# Patient Record
Sex: FEMALE | Race: WHITE | NOT HISPANIC OR LATINO
[De-identification: names, ages, dates, MRNs, and addresses within clinical notes are randomized per-mention and may not be internally consistent; named-entity substitution may affect disease eponyms.]

---

## 2017-01-23 ENCOUNTER — RECORDS - HEALTHEAST (OUTPATIENT)
Dept: ADMINISTRATIVE | Facility: OTHER | Age: 82
End: 2017-01-23

## 2017-08-30 ENCOUNTER — COMMUNICATION - HEALTHEAST (OUTPATIENT)
Dept: INTERNAL MEDICINE | Facility: CLINIC | Age: 82
End: 2017-08-30

## 2017-09-08 ENCOUNTER — RECORDS - HEALTHEAST (OUTPATIENT)
Dept: ADMINISTRATIVE | Facility: OTHER | Age: 82
End: 2017-09-08

## 2017-09-14 ENCOUNTER — COMMUNICATION - HEALTHEAST (OUTPATIENT)
Dept: INTERNAL MEDICINE | Facility: CLINIC | Age: 82
End: 2017-09-14

## 2017-11-08 ENCOUNTER — OFFICE VISIT - HEALTHEAST (OUTPATIENT)
Dept: INTERNAL MEDICINE | Facility: CLINIC | Age: 82
End: 2017-11-08

## 2017-11-08 ENCOUNTER — COMMUNICATION - HEALTHEAST (OUTPATIENT)
Dept: INTERNAL MEDICINE | Facility: CLINIC | Age: 82
End: 2017-11-08

## 2017-11-08 DIAGNOSIS — G57.11 MERALGIA PARESTHETICA OF RIGHT SIDE: ICD-10-CM

## 2017-11-08 ASSESSMENT — MIFFLIN-ST. JEOR: SCORE: 1061.27

## 2017-11-28 ENCOUNTER — RECORDS - HEALTHEAST (OUTPATIENT)
Dept: ADMINISTRATIVE | Facility: OTHER | Age: 82
End: 2017-11-28

## 2018-02-06 ENCOUNTER — RECORDS - HEALTHEAST (OUTPATIENT)
Dept: ADMINISTRATIVE | Facility: OTHER | Age: 83
End: 2018-02-06

## 2018-04-10 ENCOUNTER — RECORDS - HEALTHEAST (OUTPATIENT)
Dept: ADMINISTRATIVE | Facility: OTHER | Age: 83
End: 2018-04-10

## 2018-06-21 ENCOUNTER — OFFICE VISIT - HEALTHEAST (OUTPATIENT)
Dept: INTERNAL MEDICINE | Facility: CLINIC | Age: 83
End: 2018-06-21

## 2018-06-21 ENCOUNTER — COMMUNICATION - HEALTHEAST (OUTPATIENT)
Dept: SCHEDULING | Facility: CLINIC | Age: 83
End: 2018-06-21

## 2018-06-21 DIAGNOSIS — M54.2 CERVICALGIA: ICD-10-CM

## 2018-07-18 ENCOUNTER — OFFICE VISIT - HEALTHEAST (OUTPATIENT)
Dept: INTERNAL MEDICINE | Facility: CLINIC | Age: 83
End: 2018-07-18

## 2018-07-18 ENCOUNTER — RECORDS - HEALTHEAST (OUTPATIENT)
Dept: GENERAL RADIOLOGY | Facility: CLINIC | Age: 83
End: 2018-07-18

## 2018-07-18 DIAGNOSIS — S69.92XA UNSPECIFIED INJURY OF LEFT WRIST, HAND AND FINGER(S), INITIAL ENCOUNTER: ICD-10-CM

## 2018-07-18 DIAGNOSIS — S69.92XA WRIST INJURY, LEFT, INITIAL ENCOUNTER: ICD-10-CM

## 2018-07-18 ASSESSMENT — MIFFLIN-ST. JEOR: SCORE: 1052.2

## 2018-07-19 ENCOUNTER — COMMUNICATION - HEALTHEAST (OUTPATIENT)
Dept: INTERNAL MEDICINE | Facility: CLINIC | Age: 83
End: 2018-07-19

## 2019-01-02 ENCOUNTER — OFFICE VISIT - HEALTHEAST (OUTPATIENT)
Dept: INTERNAL MEDICINE | Facility: CLINIC | Age: 84
End: 2019-01-02

## 2019-01-02 DIAGNOSIS — J40 TRACHEOBRONCHITIS: ICD-10-CM

## 2019-01-02 ASSESSMENT — MIFFLIN-ST. JEOR: SCORE: 1065.81

## 2019-01-18 ENCOUNTER — OFFICE VISIT - HEALTHEAST (OUTPATIENT)
Dept: INTERNAL MEDICINE | Facility: CLINIC | Age: 84
End: 2019-01-18

## 2019-01-18 DIAGNOSIS — K62.5 RECTAL BLEEDING: ICD-10-CM

## 2019-01-18 ASSESSMENT — MIFFLIN-ST. JEOR: SCORE: 1052.2

## 2019-01-21 ENCOUNTER — RECORDS - HEALTHEAST (OUTPATIENT)
Dept: ADMINISTRATIVE | Facility: OTHER | Age: 84
End: 2019-01-21

## 2019-01-22 ENCOUNTER — RECORDS - HEALTHEAST (OUTPATIENT)
Dept: GENERAL RADIOLOGY | Facility: CLINIC | Age: 84
End: 2019-01-22

## 2019-01-22 ENCOUNTER — OFFICE VISIT - HEALTHEAST (OUTPATIENT)
Dept: INTERNAL MEDICINE | Facility: CLINIC | Age: 84
End: 2019-01-22

## 2019-01-22 DIAGNOSIS — C50.311 MALIGNANT NEOPLASM OF LOWER-INNER QUADRANT OF RIGHT FEMALE BREAST, UNSPECIFIED ESTROGEN RECEPTOR STATUS (H): ICD-10-CM

## 2019-01-22 DIAGNOSIS — D49.3 BREAST TUMOR: ICD-10-CM

## 2019-01-22 DIAGNOSIS — C50.311 MALIGNANT NEOPLASM OF LOWER-INNER QUADRANT OF RIGHT FEMALE BREAST (H): ICD-10-CM

## 2019-01-22 LAB
ALBUMIN SERPL-MCNC: 3.3 G/DL (ref 3.5–5)
ALP SERPL-CCNC: 79 U/L (ref 45–120)
ALT SERPL W P-5'-P-CCNC: 17 U/L (ref 0–45)
ANION GAP SERPL CALCULATED.3IONS-SCNC: 15 MMOL/L (ref 5–18)
AST SERPL W P-5'-P-CCNC: 17 U/L (ref 0–40)
BILIRUB SERPL-MCNC: 0.4 MG/DL (ref 0–1)
BUN SERPL-MCNC: 28 MG/DL (ref 8–28)
CALCIUM SERPL-MCNC: 9.7 MG/DL (ref 8.5–10.5)
CEA SERPL-MCNC: 2.7 NG/ML (ref 0–3)
CHLORIDE BLD-SCNC: 101 MMOL/L (ref 98–107)
CO2 SERPL-SCNC: 23 MMOL/L (ref 22–31)
CREAT SERPL-MCNC: 1.01 MG/DL (ref 0.6–1.1)
ERYTHROCYTE [DISTWIDTH] IN BLOOD BY AUTOMATED COUNT: 10.9 % (ref 11–14.5)
GFR SERPL CREATININE-BSD FRML MDRD: 52 ML/MIN/1.73M2
GLUCOSE BLD-MCNC: 71 MG/DL (ref 70–125)
HCT VFR BLD AUTO: 42.5 % (ref 35–47)
HGB BLD-MCNC: 14 G/DL (ref 12–16)
MCH RBC QN AUTO: 30.7 PG (ref 27–34)
MCHC RBC AUTO-ENTMCNC: 33 G/DL (ref 32–36)
MCV RBC AUTO: 93 FL (ref 80–100)
PLATELET # BLD AUTO: 315 THOU/UL (ref 140–440)
PMV BLD AUTO: 8.2 FL (ref 7–10)
POTASSIUM BLD-SCNC: 4.4 MMOL/L (ref 3.5–5)
PROT SERPL-MCNC: 7 G/DL (ref 6–8)
RBC # BLD AUTO: 4.56 MILL/UL (ref 3.8–5.4)
SODIUM SERPL-SCNC: 139 MMOL/L (ref 136–145)
WBC: 9.3 THOU/UL (ref 4–11)

## 2019-01-22 ASSESSMENT — MIFFLIN-ST. JEOR: SCORE: 1052.2

## 2019-01-23 ENCOUNTER — COMMUNICATION - HEALTHEAST (OUTPATIENT)
Dept: INTERNAL MEDICINE | Facility: CLINIC | Age: 84
End: 2019-01-23

## 2019-01-25 ENCOUNTER — HOSPITAL ENCOUNTER (OUTPATIENT)
Dept: ULTRASOUND IMAGING | Facility: CLINIC | Age: 84
Discharge: HOME | End: 2019-01-25
Attending: INTERNAL MEDICINE

## 2019-01-25 ENCOUNTER — HOSPITAL ENCOUNTER (OUTPATIENT)
Dept: MAMMOGRAPHY | Facility: CLINIC | Age: 84
Discharge: HOME | End: 2019-01-25
Attending: INTERNAL MEDICINE

## 2019-01-25 DIAGNOSIS — D49.3 BREAST TUMOR: ICD-10-CM

## 2019-01-25 DIAGNOSIS — C50.311 MALIGNANT NEOPLASM OF LOWER-INNER QUADRANT OF RIGHT FEMALE BREAST, UNSPECIFIED ESTROGEN RECEPTOR STATUS (H): ICD-10-CM

## 2019-01-28 ENCOUNTER — COMMUNICATION - HEALTHEAST (OUTPATIENT)
Dept: INTERNAL MEDICINE | Facility: CLINIC | Age: 84
End: 2019-01-28

## 2019-01-29 ENCOUNTER — OFFICE VISIT - HEALTHEAST (OUTPATIENT)
Dept: INTERNAL MEDICINE | Facility: CLINIC | Age: 84
End: 2019-01-29

## 2019-01-29 ENCOUNTER — RECORDS - HEALTHEAST (OUTPATIENT)
Dept: GENERAL RADIOLOGY | Facility: CLINIC | Age: 84
End: 2019-01-29

## 2019-01-29 DIAGNOSIS — C50.311 MALIGNANT NEOPLASM OF LOWER-INNER QUADRANT OF RIGHT FEMALE BREAST, UNSPECIFIED ESTROGEN RECEPTOR STATUS (H): ICD-10-CM

## 2019-01-29 DIAGNOSIS — I50.31 ACUTE DIASTOLIC CONGESTIVE HEART FAILURE (H): ICD-10-CM

## 2019-01-29 DIAGNOSIS — J90 PLEURAL EFFUSION, NOT ELSEWHERE CLASSIFIED: ICD-10-CM

## 2019-01-29 DIAGNOSIS — J90 PLEURAL EFFUSION: ICD-10-CM

## 2019-01-29 LAB
BNP SERPL-MCNC: 156 PG/ML (ref 0–167)
INR PPP: 0.99 (ref 0.9–1.1)

## 2019-01-29 ASSESSMENT — MIFFLIN-ST. JEOR: SCORE: 1052.2

## 2019-01-31 ENCOUNTER — COMMUNICATION - HEALTHEAST (OUTPATIENT)
Dept: INTERNAL MEDICINE | Facility: CLINIC | Age: 84
End: 2019-01-31

## 2019-01-31 ENCOUNTER — HOSPITAL ENCOUNTER (OUTPATIENT)
Dept: CT IMAGING | Facility: CLINIC | Age: 84
Discharge: HOME | End: 2019-01-31
Attending: INTERNAL MEDICINE | Admitting: RADIOLOGY

## 2019-01-31 ENCOUNTER — HOSPITAL ENCOUNTER (OUTPATIENT)
Dept: ULTRASOUND IMAGING | Facility: CLINIC | Age: 84
Discharge: HOME | End: 2019-01-31
Attending: INTERNAL MEDICINE

## 2019-01-31 DIAGNOSIS — J90 PLEURAL EFFUSION: ICD-10-CM

## 2019-01-31 DIAGNOSIS — C48.0 MALIGNANT NEOPLASM OF RETROPERITONEUM (H): ICD-10-CM

## 2019-01-31 DIAGNOSIS — C50.311 MALIGNANT NEOPLASM OF LOWER-INNER QUADRANT OF RIGHT FEMALE BREAST, UNSPECIFIED ESTROGEN RECEPTOR STATUS (H): ICD-10-CM

## 2019-01-31 LAB
APPEARANCE FLD: ABNORMAL
COLOR FLD: YELLOW
CREAT BLD-MCNC: 1 MG/DL
EOSINOPHIL NFR FLD MANUAL: ABNORMAL %
GLUCOSE FLD-MCNC: 42 MG/DL
LDH FLD L TO P-CCNC: 523 U/L
LYMPHOCYTES NFR FLD MANUAL: 11 %
MACROPHAGE % - HISTORICAL: 61 %
MESOTHELIALS, FLUID: 4 %
MONOCYTE % - HISTORICAL: 12 %
NEUTS BAND NFR FLD MANUAL: 7 %
OTHER CELLS FLD MANUAL: 6 %
POC GFR AMER AF HE - HISTORICAL: >60 ML/MIN/1.73M2
POC GFR NON AMER AF HE - HISTORICAL: 54 ML/MIN/1.73M2
PROT FLD-MCNC: 4 G/DL
RBC FLUID - HISTORICAL: ABNORMAL /UL
WBC # FLD AUTO: 3905 /UL (ref 0–99)

## 2019-02-01 ENCOUNTER — COMMUNICATION - HEALTHEAST (OUTPATIENT)
Dept: INTERNAL MEDICINE | Facility: CLINIC | Age: 84
End: 2019-02-01

## 2019-02-03 LAB
BACTERIA SPEC CULT: NO GROWTH
GRAM STAIN RESULT: NORMAL
GRAM STAIN RESULT: NORMAL

## 2019-02-04 ENCOUNTER — RECORDS - HEALTHEAST (OUTPATIENT)
Dept: ADMINISTRATIVE | Facility: OTHER | Age: 84
End: 2019-02-04

## 2019-02-05 LAB
CAP COMMENT: ABNORMAL
LAB AP CHARGES (HE HISTORICAL CONVERSION): ABNORMAL
LAB MED GENERAL PATH INTERP (HE HISTORICAL CONVERSION): ABNORMAL
PATH REPORT.COMMENTS IMP SPEC: ABNORMAL
PATH REPORT.FINAL DX SPEC: ABNORMAL
PATH REPORT.MICROSCOPIC SPEC OTHER STN: ABNORMAL
PATH REPORT.MICROSCOPIC SPEC OTHER STN: ABNORMAL
PATH REPORT.RELEVANT HX SPEC: ABNORMAL
SPECIMEN DESCRIPTION: ABNORMAL

## 2019-02-07 ENCOUNTER — AMBULATORY - HEALTHEAST (OUTPATIENT)
Dept: ONCOLOGY | Facility: CLINIC | Age: 84
End: 2019-02-07

## 2019-02-07 ENCOUNTER — OFFICE VISIT - HEALTHEAST (OUTPATIENT)
Dept: ONCOLOGY | Facility: CLINIC | Age: 84
End: 2019-02-07

## 2019-02-07 ENCOUNTER — COMMUNICATION - HEALTHEAST (OUTPATIENT)
Dept: ONCOLOGY | Facility: CLINIC | Age: 84
End: 2019-02-07

## 2019-02-07 DIAGNOSIS — Z79.899 ENCOUNTER FOR MEDICATION MANAGEMENT: ICD-10-CM

## 2019-02-07 DIAGNOSIS — J90 PLEURAL EFFUSION: ICD-10-CM

## 2019-02-07 DIAGNOSIS — C50.311 MALIGNANT NEOPLASM OF LOWER-INNER QUADRANT OF RIGHT FEMALE BREAST, UNSPECIFIED ESTROGEN RECEPTOR STATUS (H): ICD-10-CM

## 2019-02-07 ASSESSMENT — MIFFLIN-ST. JEOR: SCORE: 1055.04

## 2019-02-08 ENCOUNTER — COMMUNICATION - HEALTHEAST (OUTPATIENT)
Dept: ONCOLOGY | Facility: CLINIC | Age: 84
End: 2019-02-08

## 2019-02-08 LAB — CANCER AG27-29 SERPL-ACNC: 62 U/ML (ref 0–39)

## 2019-02-13 ENCOUNTER — COMMUNICATION - HEALTHEAST (OUTPATIENT)
Dept: INTERNAL MEDICINE | Facility: CLINIC | Age: 84
End: 2019-02-13

## 2019-02-14 ENCOUNTER — AMBULATORY - HEALTHEAST (OUTPATIENT)
Dept: INFUSION THERAPY | Facility: CLINIC | Age: 84
End: 2019-02-14

## 2019-02-14 ENCOUNTER — HOSPITAL ENCOUNTER (OUTPATIENT)
Dept: NUCLEAR MEDICINE | Facility: CLINIC | Age: 84
Setting detail: RADIATION/ONCOLOGY SERIES
Discharge: STILL ADMITTED OR ADMINISTRATIVE | End: 2019-02-14
Attending: INTERNAL MEDICINE

## 2019-02-14 ENCOUNTER — HOSPITAL ENCOUNTER (OUTPATIENT)
Dept: ULTRASOUND IMAGING | Facility: CLINIC | Age: 84
Setting detail: RADIATION/ONCOLOGY SERIES
Discharge: STILL ADMITTED OR ADMINISTRATIVE | End: 2019-02-14
Attending: INTERNAL MEDICINE

## 2019-02-14 DIAGNOSIS — C50.311 MALIGNANT NEOPLASM OF LOWER-INNER QUADRANT OF RIGHT FEMALE BREAST, UNSPECIFIED ESTROGEN RECEPTOR STATUS (H): ICD-10-CM

## 2019-02-14 DIAGNOSIS — J90 PLEURAL EFFUSION: ICD-10-CM

## 2019-02-14 DIAGNOSIS — Z79.899 ENCOUNTER FOR MEDICATION MANAGEMENT: ICD-10-CM

## 2019-02-14 LAB
BASOPHILS # BLD AUTO: 0.1 THOU/UL (ref 0–0.2)
BASOPHILS NFR BLD AUTO: 1 % (ref 0–2)
EOSINOPHIL # BLD AUTO: 0.1 THOU/UL (ref 0–0.4)
EOSINOPHIL NFR BLD AUTO: 1 % (ref 0–6)
ERYTHROCYTE [DISTWIDTH] IN BLOOD BY AUTOMATED COUNT: 12.8 % (ref 11–14.5)
HCT VFR BLD AUTO: 41.2 % (ref 35–47)
HGB BLD-MCNC: 13.6 G/DL (ref 12–16)
LYMPHOCYTES # BLD AUTO: 1.5 THOU/UL (ref 0.8–4.4)
LYMPHOCYTES NFR BLD AUTO: 18 % (ref 20–40)
MCH RBC QN AUTO: 30.8 PG (ref 27–34)
MCHC RBC AUTO-ENTMCNC: 33 G/DL (ref 32–36)
MCV RBC AUTO: 93 FL (ref 80–100)
MONOCYTES # BLD AUTO: 0.7 THOU/UL (ref 0–0.9)
MONOCYTES NFR BLD AUTO: 9 % (ref 2–10)
NEUTROPHILS # BLD AUTO: 6 THOU/UL (ref 2–7.7)
NEUTROPHILS NFR BLD AUTO: 71 % (ref 50–70)
PLATELET # BLD AUTO: 298 THOU/UL (ref 140–440)
PMV BLD AUTO: 9.6 FL (ref 8.5–12.5)
RBC # BLD AUTO: 4.42 MILL/UL (ref 3.8–5.4)
WBC: 8.4 THOU/UL (ref 4–11)

## 2019-02-22 ENCOUNTER — AMBULATORY - HEALTHEAST (OUTPATIENT)
Dept: INFUSION THERAPY | Facility: CLINIC | Age: 84
End: 2019-02-22

## 2019-02-22 DIAGNOSIS — C50.311 MALIGNANT NEOPLASM OF LOWER-INNER QUADRANT OF RIGHT FEMALE BREAST, UNSPECIFIED ESTROGEN RECEPTOR STATUS (H): ICD-10-CM

## 2019-02-22 DIAGNOSIS — Z79.899 ENCOUNTER FOR MEDICATION MANAGEMENT: ICD-10-CM

## 2019-02-22 LAB
BASOPHILS # BLD AUTO: 0.1 THOU/UL (ref 0–0.2)
BASOPHILS NFR BLD AUTO: 1 % (ref 0–2)
EOSINOPHIL # BLD AUTO: 0.1 THOU/UL (ref 0–0.4)
EOSINOPHIL NFR BLD AUTO: 2 % (ref 0–6)
ERYTHROCYTE [DISTWIDTH] IN BLOOD BY AUTOMATED COUNT: 12.9 % (ref 11–14.5)
HCT VFR BLD AUTO: 42.3 % (ref 35–47)
HGB BLD-MCNC: 13.7 G/DL (ref 12–16)
LYMPHOCYTES # BLD AUTO: 2.1 THOU/UL (ref 0.8–4.4)
LYMPHOCYTES NFR BLD AUTO: 25 % (ref 20–40)
MCH RBC QN AUTO: 30.6 PG (ref 27–34)
MCHC RBC AUTO-ENTMCNC: 32.4 G/DL (ref 32–36)
MCV RBC AUTO: 94 FL (ref 80–100)
MONOCYTES # BLD AUTO: 0.5 THOU/UL (ref 0–0.9)
MONOCYTES NFR BLD AUTO: 6 % (ref 2–10)
NEUTROPHILS # BLD AUTO: 5.7 THOU/UL (ref 2–7.7)
NEUTROPHILS NFR BLD AUTO: 67 % (ref 50–70)
PLATELET # BLD AUTO: 302 THOU/UL (ref 140–440)
PMV BLD AUTO: 10.1 FL (ref 8.5–12.5)
RBC # BLD AUTO: 4.48 MILL/UL (ref 3.8–5.4)
WBC: 8.6 THOU/UL (ref 4–11)

## 2019-03-07 ENCOUNTER — AMBULATORY - HEALTHEAST (OUTPATIENT)
Dept: INFUSION THERAPY | Facility: CLINIC | Age: 84
End: 2019-03-07

## 2019-03-07 ENCOUNTER — RECORDS - HEALTHEAST (OUTPATIENT)
Dept: ADMINISTRATIVE | Facility: OTHER | Age: 84
End: 2019-03-07

## 2019-03-07 ENCOUNTER — OFFICE VISIT - HEALTHEAST (OUTPATIENT)
Dept: ONCOLOGY | Facility: CLINIC | Age: 84
End: 2019-03-07

## 2019-03-07 DIAGNOSIS — C50.311 MALIGNANT NEOPLASM OF LOWER-INNER QUADRANT OF RIGHT FEMALE BREAST, UNSPECIFIED ESTROGEN RECEPTOR STATUS (H): ICD-10-CM

## 2019-03-07 DIAGNOSIS — Z79.899 ENCOUNTER FOR MEDICATION MANAGEMENT: ICD-10-CM

## 2019-03-07 LAB
ALBUMIN SERPL-MCNC: 3.1 G/DL (ref 3.5–5)
ALP SERPL-CCNC: 77 U/L (ref 45–120)
ALT SERPL W P-5'-P-CCNC: 16 U/L (ref 0–45)
ANION GAP SERPL CALCULATED.3IONS-SCNC: 7 MMOL/L (ref 5–18)
AST SERPL W P-5'-P-CCNC: 17 U/L (ref 0–40)
BASOPHILS # BLD AUTO: 0.1 THOU/UL (ref 0–0.2)
BASOPHILS NFR BLD AUTO: 1 % (ref 0–2)
BILIRUB SERPL-MCNC: 0.4 MG/DL (ref 0–1)
BUN SERPL-MCNC: 27 MG/DL (ref 8–28)
CALCIUM SERPL-MCNC: 9.7 MG/DL (ref 8.5–10.5)
CHLORIDE BLD-SCNC: 100 MMOL/L (ref 98–107)
CO2 SERPL-SCNC: 31 MMOL/L (ref 22–31)
CREAT SERPL-MCNC: 1.07 MG/DL (ref 0.6–1.1)
EOSINOPHIL # BLD AUTO: 0.2 THOU/UL (ref 0–0.4)
EOSINOPHIL NFR BLD AUTO: 3 % (ref 0–6)
ERYTHROCYTE [DISTWIDTH] IN BLOOD BY AUTOMATED COUNT: 13 % (ref 11–14.5)
GFR SERPL CREATININE-BSD FRML MDRD: 49 ML/MIN/1.73M2
GLUCOSE BLD-MCNC: 119 MG/DL (ref 70–125)
HCT VFR BLD AUTO: 43.5 % (ref 35–47)
HGB BLD-MCNC: 14.1 G/DL (ref 12–16)
LYMPHOCYTES # BLD AUTO: 1.3 THOU/UL (ref 0.8–4.4)
LYMPHOCYTES NFR BLD AUTO: 15 % (ref 20–40)
MCH RBC QN AUTO: 30.5 PG (ref 27–34)
MCHC RBC AUTO-ENTMCNC: 32.4 G/DL (ref 32–36)
MCV RBC AUTO: 94 FL (ref 80–100)
MONOCYTES # BLD AUTO: 0.5 THOU/UL (ref 0–0.9)
MONOCYTES NFR BLD AUTO: 5 % (ref 2–10)
NEUTROPHILS # BLD AUTO: 6.6 THOU/UL (ref 2–7.7)
NEUTROPHILS NFR BLD AUTO: 77 % (ref 50–70)
PLATELET # BLD AUTO: 275 THOU/UL (ref 140–440)
PMV BLD AUTO: 9.6 FL (ref 8.5–12.5)
POTASSIUM BLD-SCNC: 4.4 MMOL/L (ref 3.5–5)
PROT SERPL-MCNC: 6.9 G/DL (ref 6–8)
RBC # BLD AUTO: 4.63 MILL/UL (ref 3.8–5.4)
SODIUM SERPL-SCNC: 138 MMOL/L (ref 136–145)
WBC: 8.6 THOU/UL (ref 4–11)

## 2019-03-12 ENCOUNTER — AMBULATORY - HEALTHEAST (OUTPATIENT)
Dept: ONCOLOGY | Facility: CLINIC | Age: 84
End: 2019-03-12

## 2019-03-12 ENCOUNTER — COMMUNICATION - HEALTHEAST (OUTPATIENT)
Dept: ONCOLOGY | Facility: CLINIC | Age: 84
End: 2019-03-12

## 2019-03-12 DIAGNOSIS — C50.311 MALIGNANT NEOPLASM OF LOWER-INNER QUADRANT OF RIGHT BREAST OF FEMALE, ESTROGEN RECEPTOR POSITIVE (H): ICD-10-CM

## 2019-03-12 DIAGNOSIS — J90 PLEURAL EFFUSION: ICD-10-CM

## 2019-03-12 DIAGNOSIS — R79.1 ABNORMAL COAGULATION PROFILE: ICD-10-CM

## 2019-03-12 DIAGNOSIS — Z17.0 MALIGNANT NEOPLASM OF LOWER-INNER QUADRANT OF RIGHT BREAST OF FEMALE, ESTROGEN RECEPTOR POSITIVE (H): ICD-10-CM

## 2019-03-15 ENCOUNTER — COMMUNICATION - HEALTHEAST (OUTPATIENT)
Dept: INTERNAL MEDICINE | Facility: CLINIC | Age: 84
End: 2019-03-15

## 2019-03-15 ENCOUNTER — AMBULATORY - HEALTHEAST (OUTPATIENT)
Dept: LAB | Facility: CLINIC | Age: 84
End: 2019-03-15

## 2019-03-15 ENCOUNTER — HOSPITAL ENCOUNTER (OUTPATIENT)
Dept: ULTRASOUND IMAGING | Facility: CLINIC | Age: 84
Discharge: HOME | End: 2019-03-15
Attending: INTERNAL MEDICINE | Admitting: RADIOLOGY

## 2019-03-15 DIAGNOSIS — Z17.0 MALIGNANT NEOPLASM OF LOWER-INNER QUADRANT OF RIGHT BREAST OF FEMALE, ESTROGEN RECEPTOR POSITIVE (H): ICD-10-CM

## 2019-03-15 DIAGNOSIS — C50.311 MALIGNANT NEOPLASM OF LOWER-INNER QUADRANT OF RIGHT BREAST OF FEMALE, ESTROGEN RECEPTOR POSITIVE (H): ICD-10-CM

## 2019-03-15 DIAGNOSIS — J90 PLEURAL EFFUSION: ICD-10-CM

## 2019-03-15 DIAGNOSIS — C50.311 MALIGNANT NEOPLASM OF LOWER-INNER QUADRANT OF RIGHT FEMALE BREAST, UNSPECIFIED ESTROGEN RECEPTOR STATUS (H): ICD-10-CM

## 2019-03-15 DIAGNOSIS — R79.1 ABNORMAL COAGULATION PROFILE: ICD-10-CM

## 2019-03-15 LAB
ALBUMIN SERPL-MCNC: 3.1 G/DL (ref 3.5–5)
ALP SERPL-CCNC: 86 U/L (ref 45–120)
ALT SERPL W P-5'-P-CCNC: 13 U/L (ref 0–45)
ANION GAP SERPL CALCULATED.3IONS-SCNC: 9 MMOL/L (ref 5–18)
APTT PPP: 28 SECONDS (ref 24–37)
AST SERPL W P-5'-P-CCNC: 16 U/L (ref 0–40)
BILIRUB SERPL-MCNC: 0.3 MG/DL (ref 0–1)
BUN SERPL-MCNC: 22 MG/DL (ref 8–28)
CALCIUM SERPL-MCNC: 9.6 MG/DL (ref 8.5–10.5)
CHLORIDE BLD-SCNC: 101 MMOL/L (ref 98–107)
CO2 SERPL-SCNC: 28 MMOL/L (ref 22–31)
CREAT SERPL-MCNC: 1.03 MG/DL (ref 0.6–1.1)
ERYTHROCYTE [DISTWIDTH] IN BLOOD BY AUTOMATED COUNT: 13.3 % (ref 11–14.5)
GFR SERPL CREATININE-BSD FRML MDRD: 51 ML/MIN/1.73M2
GLUCOSE BLD-MCNC: 141 MG/DL (ref 70–125)
HCT VFR BLD AUTO: 43.7 % (ref 35–47)
HGB BLD-MCNC: 14.3 G/DL (ref 12–16)
INR PPP: 1.01 (ref 0.9–1.1)
MCH RBC QN AUTO: 30.8 PG (ref 27–34)
MCHC RBC AUTO-ENTMCNC: 32.7 G/DL (ref 32–36)
MCV RBC AUTO: 94 FL (ref 80–100)
PLATELET # BLD AUTO: 297 THOU/UL (ref 140–440)
PMV BLD AUTO: 9.6 FL (ref 8.5–12.5)
POTASSIUM BLD-SCNC: 4.3 MMOL/L (ref 3.5–5)
PROT SERPL-MCNC: 7.3 G/DL (ref 6–8)
RBC # BLD AUTO: 4.65 MILL/UL (ref 3.8–5.4)
SODIUM SERPL-SCNC: 138 MMOL/L (ref 136–145)
WBC: 9.5 THOU/UL (ref 4–11)

## 2019-03-29 ENCOUNTER — COMMUNICATION - HEALTHEAST (OUTPATIENT)
Dept: ONCOLOGY | Facility: CLINIC | Age: 84
End: 2019-03-29

## 2019-04-04 ENCOUNTER — COMMUNICATION - HEALTHEAST (OUTPATIENT)
Dept: ONCOLOGY | Facility: CLINIC | Age: 84
End: 2019-04-04

## 2019-04-04 ENCOUNTER — AMBULATORY - HEALTHEAST (OUTPATIENT)
Dept: INFUSION THERAPY | Facility: CLINIC | Age: 84
End: 2019-04-04

## 2019-04-04 ENCOUNTER — OFFICE VISIT - HEALTHEAST (OUTPATIENT)
Dept: ONCOLOGY | Facility: CLINIC | Age: 84
End: 2019-04-04

## 2019-04-04 ENCOUNTER — AMBULATORY - HEALTHEAST (OUTPATIENT)
Dept: ONCOLOGY | Facility: CLINIC | Age: 84
End: 2019-04-04

## 2019-04-04 DIAGNOSIS — C50.311 MALIGNANT NEOPLASM OF LOWER-INNER QUADRANT OF RIGHT FEMALE BREAST, UNSPECIFIED ESTROGEN RECEPTOR STATUS (H): ICD-10-CM

## 2019-04-04 DIAGNOSIS — Z79.899 ENCOUNTER FOR MEDICATION MANAGEMENT: ICD-10-CM

## 2019-04-04 DIAGNOSIS — C50.319 MALIGNANT NEOPLASM OF LOWER-INNER QUADRANT OF FEMALE BREAST (H): ICD-10-CM

## 2019-04-04 DIAGNOSIS — Z79.811 AROMATASE INHIBITOR USE: ICD-10-CM

## 2019-04-04 DIAGNOSIS — J90 PLEURAL EFFUSION: ICD-10-CM

## 2019-04-04 LAB
ALBUMIN SERPL-MCNC: 3.1 G/DL (ref 3.5–5)
ALP SERPL-CCNC: 88 U/L (ref 45–120)
ALT SERPL W P-5'-P-CCNC: 28 U/L (ref 0–45)
ANION GAP SERPL CALCULATED.3IONS-SCNC: 8 MMOL/L (ref 5–18)
AST SERPL W P-5'-P-CCNC: 21 U/L (ref 0–40)
BASOPHILS # BLD AUTO: 0.1 THOU/UL (ref 0–0.2)
BASOPHILS NFR BLD AUTO: 1 % (ref 0–2)
BILIRUB SERPL-MCNC: 0.4 MG/DL (ref 0–1)
BUN SERPL-MCNC: 15 MG/DL (ref 8–28)
CALCIUM SERPL-MCNC: 9.1 MG/DL (ref 8.5–10.5)
CANCER AG27-29 SERPL-ACNC: 27 U/ML (ref 0–39)
CHLORIDE BLD-SCNC: 100 MMOL/L (ref 98–107)
CO2 SERPL-SCNC: 29 MMOL/L (ref 22–31)
CREAT SERPL-MCNC: 0.87 MG/DL (ref 0.6–1.1)
EOSINOPHIL # BLD AUTO: 0.4 THOU/UL (ref 0–0.4)
EOSINOPHIL NFR BLD AUTO: 4 % (ref 0–6)
ERYTHROCYTE [DISTWIDTH] IN BLOOD BY AUTOMATED COUNT: 13.5 % (ref 11–14.5)
GFR SERPL CREATININE-BSD FRML MDRD: >60 ML/MIN/1.73M2
GLUCOSE BLD-MCNC: 97 MG/DL (ref 70–125)
HCT VFR BLD AUTO: 41.8 % (ref 35–47)
HGB BLD-MCNC: 13.4 G/DL (ref 12–16)
LYMPHOCYTES # BLD AUTO: 1.6 THOU/UL (ref 0.8–4.4)
LYMPHOCYTES NFR BLD AUTO: 18 % (ref 20–40)
MCH RBC QN AUTO: 30.2 PG (ref 27–34)
MCHC RBC AUTO-ENTMCNC: 32.1 G/DL (ref 32–36)
MCV RBC AUTO: 94 FL (ref 80–100)
MONOCYTES # BLD AUTO: 0.7 THOU/UL (ref 0–0.9)
MONOCYTES NFR BLD AUTO: 8 % (ref 2–10)
NEUTROPHILS # BLD AUTO: 6.1 THOU/UL (ref 2–7.7)
NEUTROPHILS NFR BLD AUTO: 69 % (ref 50–70)
PLATELET # BLD AUTO: 330 THOU/UL (ref 140–440)
PMV BLD AUTO: 9.5 FL (ref 8.5–12.5)
POTASSIUM BLD-SCNC: 4.9 MMOL/L (ref 3.5–5)
PROT SERPL-MCNC: 6.7 G/DL (ref 6–8)
RBC # BLD AUTO: 4.44 MILL/UL (ref 3.8–5.4)
SODIUM SERPL-SCNC: 137 MMOL/L (ref 136–145)
WBC: 8.8 THOU/UL (ref 4–11)

## 2019-04-05 ENCOUNTER — COMMUNICATION - HEALTHEAST (OUTPATIENT)
Dept: ONCOLOGY | Facility: CLINIC | Age: 84
End: 2019-04-05

## 2019-04-18 ENCOUNTER — AMBULATORY - HEALTHEAST (OUTPATIENT)
Dept: ONCOLOGY | Facility: CLINIC | Age: 84
End: 2019-04-18

## 2019-04-18 ENCOUNTER — COMMUNICATION - HEALTHEAST (OUTPATIENT)
Dept: ONCOLOGY | Facility: CLINIC | Age: 84
End: 2019-04-18

## 2019-04-18 DIAGNOSIS — C50.311 MALIGNANT NEOPLASM OF LOWER-INNER QUADRANT OF RIGHT BREAST OF FEMALE, ESTROGEN RECEPTOR POSITIVE (H): ICD-10-CM

## 2019-04-18 DIAGNOSIS — Z17.0 MALIGNANT NEOPLASM OF LOWER-INNER QUADRANT OF RIGHT BREAST OF FEMALE, ESTROGEN RECEPTOR POSITIVE (H): ICD-10-CM

## 2019-04-18 DIAGNOSIS — J90 PLEURAL EFFUSION: ICD-10-CM

## 2019-04-19 ENCOUNTER — HOSPITAL ENCOUNTER (OUTPATIENT)
Dept: ULTRASOUND IMAGING | Facility: CLINIC | Age: 84
Setting detail: RADIATION/ONCOLOGY SERIES
Discharge: STILL ADMITTED OR ADMINISTRATIVE | End: 2019-04-19
Attending: INTERNAL MEDICINE

## 2019-04-19 DIAGNOSIS — Z17.0 MALIGNANT NEOPLASM OF LOWER-INNER QUADRANT OF RIGHT BREAST OF FEMALE, ESTROGEN RECEPTOR POSITIVE (H): ICD-10-CM

## 2019-04-19 DIAGNOSIS — J90 PLEURAL EFFUSION: ICD-10-CM

## 2019-04-19 DIAGNOSIS — C50.311 MALIGNANT NEOPLASM OF LOWER-INNER QUADRANT OF RIGHT BREAST OF FEMALE, ESTROGEN RECEPTOR POSITIVE (H): ICD-10-CM

## 2019-05-02 ENCOUNTER — AMBULATORY - HEALTHEAST (OUTPATIENT)
Dept: INFUSION THERAPY | Facility: CLINIC | Age: 84
End: 2019-05-02

## 2019-05-02 ENCOUNTER — OFFICE VISIT - HEALTHEAST (OUTPATIENT)
Dept: ONCOLOGY | Facility: CLINIC | Age: 84
End: 2019-05-02

## 2019-05-02 ENCOUNTER — HOSPITAL ENCOUNTER (OUTPATIENT)
Dept: ULTRASOUND IMAGING | Facility: CLINIC | Age: 84
Setting detail: RADIATION/ONCOLOGY SERIES
Discharge: STILL ADMITTED OR ADMINISTRATIVE | End: 2019-05-02
Attending: INTERNAL MEDICINE

## 2019-05-02 DIAGNOSIS — C50.311 MALIGNANT NEOPLASM OF LOWER-INNER QUADRANT OF RIGHT BREAST OF FEMALE, ESTROGEN RECEPTOR POSITIVE (H): ICD-10-CM

## 2019-05-02 DIAGNOSIS — Z17.0 MALIGNANT NEOPLASM OF LOWER-INNER QUADRANT OF RIGHT BREAST OF FEMALE, ESTROGEN RECEPTOR POSITIVE (H): ICD-10-CM

## 2019-05-02 DIAGNOSIS — C50.311 MALIGNANT NEOPLASM OF LOWER-INNER QUADRANT OF RIGHT FEMALE BREAST, UNSPECIFIED ESTROGEN RECEPTOR STATUS (H): ICD-10-CM

## 2019-05-02 DIAGNOSIS — Z79.899 ENCOUNTER FOR MEDICATION MANAGEMENT: ICD-10-CM

## 2019-05-02 DIAGNOSIS — J90 PLEURAL EFFUSION: ICD-10-CM

## 2019-05-02 DIAGNOSIS — C50.319 MALIGNANT NEOPLASM OF LOWER-INNER QUADRANT OF FEMALE BREAST (H): ICD-10-CM

## 2019-05-02 LAB
ALBUMIN SERPL-MCNC: 2.9 G/DL (ref 3.5–5)
ALP SERPL-CCNC: 80 U/L (ref 45–120)
ALT SERPL W P-5'-P-CCNC: 22 U/L (ref 0–45)
ANION GAP SERPL CALCULATED.3IONS-SCNC: 9 MMOL/L (ref 5–18)
AST SERPL W P-5'-P-CCNC: 19 U/L (ref 0–40)
BASOPHILS # BLD AUTO: 0.1 THOU/UL (ref 0–0.2)
BASOPHILS NFR BLD AUTO: 1 % (ref 0–2)
BILIRUB SERPL-MCNC: 0.3 MG/DL (ref 0–1)
BUN SERPL-MCNC: 22 MG/DL (ref 8–28)
CALCIUM SERPL-MCNC: 9.4 MG/DL (ref 8.5–10.5)
CHLORIDE BLD-SCNC: 102 MMOL/L (ref 98–107)
CO2 SERPL-SCNC: 24 MMOL/L (ref 22–31)
CREAT SERPL-MCNC: 0.99 MG/DL (ref 0.6–1.1)
EOSINOPHIL # BLD AUTO: 0 THOU/UL (ref 0–0.4)
EOSINOPHIL NFR BLD AUTO: 0 % (ref 0–6)
ERYTHROCYTE [DISTWIDTH] IN BLOOD BY AUTOMATED COUNT: 13.7 % (ref 11–14.5)
GFR SERPL CREATININE-BSD FRML MDRD: 53 ML/MIN/1.73M2
GLUCOSE BLD-MCNC: 129 MG/DL (ref 70–125)
HCT VFR BLD AUTO: 39.7 % (ref 35–47)
HGB BLD-MCNC: 13.1 G/DL (ref 12–16)
INR PPP: 1.01 (ref 0.9–1.1)
LYMPHOCYTES # BLD AUTO: 1.3 THOU/UL (ref 0.8–4.4)
LYMPHOCYTES NFR BLD AUTO: 14 % (ref 20–40)
MCH RBC QN AUTO: 30.5 PG (ref 27–34)
MCHC RBC AUTO-ENTMCNC: 33 G/DL (ref 32–36)
MCV RBC AUTO: 92 FL (ref 80–100)
MONOCYTES # BLD AUTO: 1.1 THOU/UL (ref 0–0.9)
MONOCYTES NFR BLD AUTO: 12 % (ref 2–10)
NEUTROPHILS # BLD AUTO: 6.5 THOU/UL (ref 2–7.7)
NEUTROPHILS NFR BLD AUTO: 73 % (ref 50–70)
PLATELET # BLD AUTO: 283 THOU/UL (ref 140–440)
PMV BLD AUTO: 9.6 FL (ref 8.5–12.5)
POTASSIUM BLD-SCNC: 4.3 MMOL/L (ref 3.5–5)
PROT SERPL-MCNC: 6.9 G/DL (ref 6–8)
RBC # BLD AUTO: 4.3 MILL/UL (ref 3.8–5.4)
SODIUM SERPL-SCNC: 135 MMOL/L (ref 136–145)
WBC: 8.9 THOU/UL (ref 4–11)

## 2019-05-15 ENCOUNTER — HOSPITAL ENCOUNTER (OUTPATIENT)
Dept: ULTRASOUND IMAGING | Facility: CLINIC | Age: 84
Setting detail: RADIATION/ONCOLOGY SERIES
Discharge: STILL ADMITTED OR ADMINISTRATIVE | End: 2019-05-15
Attending: INTERNAL MEDICINE

## 2019-05-15 DIAGNOSIS — C50.311 MALIGNANT NEOPLASM OF LOWER-INNER QUADRANT OF RIGHT BREAST OF FEMALE, ESTROGEN RECEPTOR POSITIVE (H): ICD-10-CM

## 2019-05-15 DIAGNOSIS — J90 PLEURAL EFFUSION: ICD-10-CM

## 2019-05-15 DIAGNOSIS — Z17.0 MALIGNANT NEOPLASM OF LOWER-INNER QUADRANT OF RIGHT BREAST OF FEMALE, ESTROGEN RECEPTOR POSITIVE (H): ICD-10-CM

## 2019-05-24 ENCOUNTER — HOSPITAL ENCOUNTER (OUTPATIENT)
Dept: MRI IMAGING | Facility: CLINIC | Age: 84
Setting detail: RADIATION/ONCOLOGY SERIES
Discharge: STILL ADMITTED OR ADMINISTRATIVE | End: 2019-05-24
Attending: INTERNAL MEDICINE

## 2019-05-24 ENCOUNTER — HOSPITAL ENCOUNTER (OUTPATIENT)
Dept: CT IMAGING | Facility: CLINIC | Age: 84
Setting detail: RADIATION/ONCOLOGY SERIES
Discharge: STILL ADMITTED OR ADMINISTRATIVE | End: 2019-05-24
Attending: INTERNAL MEDICINE

## 2019-05-24 DIAGNOSIS — C50.311 MALIGNANT NEOPLASM OF LOWER-INNER QUADRANT OF RIGHT BREAST OF FEMALE, ESTROGEN RECEPTOR POSITIVE (H): ICD-10-CM

## 2019-05-24 DIAGNOSIS — Z17.0 MALIGNANT NEOPLASM OF LOWER-INNER QUADRANT OF RIGHT BREAST OF FEMALE, ESTROGEN RECEPTOR POSITIVE (H): ICD-10-CM

## 2019-05-28 ENCOUNTER — COMMUNICATION - HEALTHEAST (OUTPATIENT)
Dept: INTERNAL MEDICINE | Facility: CLINIC | Age: 84
End: 2019-05-28

## 2019-05-28 DIAGNOSIS — L40.9 PSORIASIS: ICD-10-CM

## 2019-05-29 ENCOUNTER — COMMUNICATION - HEALTHEAST (OUTPATIENT)
Dept: INTERNAL MEDICINE | Facility: CLINIC | Age: 84
End: 2019-05-29

## 2019-05-29 ENCOUNTER — HOSPITAL ENCOUNTER (OUTPATIENT)
Dept: ULTRASOUND IMAGING | Facility: CLINIC | Age: 84
Setting detail: RADIATION/ONCOLOGY SERIES
Discharge: STILL ADMITTED OR ADMINISTRATIVE | End: 2019-05-29
Attending: INTERNAL MEDICINE

## 2019-05-29 ENCOUNTER — OFFICE VISIT - HEALTHEAST (OUTPATIENT)
Dept: ONCOLOGY | Facility: CLINIC | Age: 84
End: 2019-05-29

## 2019-05-29 ENCOUNTER — AMBULATORY - HEALTHEAST (OUTPATIENT)
Dept: INFUSION THERAPY | Facility: CLINIC | Age: 84
End: 2019-05-29

## 2019-05-29 DIAGNOSIS — Z17.0 MALIGNANT NEOPLASM OF LOWER-INNER QUADRANT OF RIGHT BREAST OF FEMALE, ESTROGEN RECEPTOR POSITIVE (H): ICD-10-CM

## 2019-05-29 DIAGNOSIS — Z79.899 ENCOUNTER FOR MEDICATION MANAGEMENT: ICD-10-CM

## 2019-05-29 DIAGNOSIS — C50.311 MALIGNANT NEOPLASM OF LOWER-INNER QUADRANT OF RIGHT BREAST OF FEMALE, ESTROGEN RECEPTOR POSITIVE (H): ICD-10-CM

## 2019-05-29 DIAGNOSIS — C50.311 MALIGNANT NEOPLASM OF LOWER-INNER QUADRANT OF RIGHT FEMALE BREAST, UNSPECIFIED ESTROGEN RECEPTOR STATUS (H): ICD-10-CM

## 2019-05-29 DIAGNOSIS — Z79.811 AROMATASE INHIBITOR USE: ICD-10-CM

## 2019-05-29 DIAGNOSIS — L40.9 PSORIASIS: ICD-10-CM

## 2019-05-29 DIAGNOSIS — J90 PLEURAL EFFUSION: ICD-10-CM

## 2019-05-29 LAB
ALBUMIN SERPL-MCNC: 3 G/DL (ref 3.5–5)
ALP SERPL-CCNC: 72 U/L (ref 45–120)
ALT SERPL W P-5'-P-CCNC: 18 U/L (ref 0–45)
ANION GAP SERPL CALCULATED.3IONS-SCNC: 10 MMOL/L (ref 5–18)
AST SERPL W P-5'-P-CCNC: 16 U/L (ref 0–40)
BASOPHILS # BLD AUTO: 0.1 THOU/UL (ref 0–0.2)
BASOPHILS NFR BLD AUTO: 1 % (ref 0–2)
BILIRUB SERPL-MCNC: 0.2 MG/DL (ref 0–1)
BUN SERPL-MCNC: 25 MG/DL (ref 8–28)
CALCIUM SERPL-MCNC: 9.6 MG/DL (ref 8.5–10.5)
CHLORIDE BLD-SCNC: 101 MMOL/L (ref 98–107)
CO2 SERPL-SCNC: 27 MMOL/L (ref 22–31)
CREAT SERPL-MCNC: 1 MG/DL (ref 0.6–1.1)
EOSINOPHIL # BLD AUTO: 0.2 THOU/UL (ref 0–0.4)
EOSINOPHIL NFR BLD AUTO: 2 % (ref 0–6)
ERYTHROCYTE [DISTWIDTH] IN BLOOD BY AUTOMATED COUNT: 13.4 % (ref 11–14.5)
GFR SERPL CREATININE-BSD FRML MDRD: 53 ML/MIN/1.73M2
GLUCOSE BLD-MCNC: 91 MG/DL (ref 70–125)
HCT VFR BLD AUTO: 39.8 % (ref 35–47)
HGB BLD-MCNC: 12.9 G/DL (ref 12–16)
LYMPHOCYTES # BLD AUTO: 2 THOU/UL (ref 0.8–4.4)
LYMPHOCYTES NFR BLD AUTO: 20 % (ref 20–40)
MCH RBC QN AUTO: 30.4 PG (ref 27–34)
MCHC RBC AUTO-ENTMCNC: 32.4 G/DL (ref 32–36)
MCV RBC AUTO: 94 FL (ref 80–100)
MONOCYTES # BLD AUTO: 0.7 THOU/UL (ref 0–0.9)
MONOCYTES NFR BLD AUTO: 7 % (ref 2–10)
NEUTROPHILS # BLD AUTO: 6.7 THOU/UL (ref 2–7.7)
NEUTROPHILS NFR BLD AUTO: 70 % (ref 50–70)
PLATELET # BLD AUTO: 338 THOU/UL (ref 140–440)
PMV BLD AUTO: 9.6 FL (ref 8.5–12.5)
POTASSIUM BLD-SCNC: 4.3 MMOL/L (ref 3.5–5)
PROT SERPL-MCNC: 7.3 G/DL (ref 6–8)
RBC # BLD AUTO: 4.24 MILL/UL (ref 3.8–5.4)
SODIUM SERPL-SCNC: 138 MMOL/L (ref 136–145)
WBC: 9.6 THOU/UL (ref 4–11)

## 2019-05-30 ENCOUNTER — COMMUNICATION - HEALTHEAST (OUTPATIENT)
Dept: ONCOLOGY | Facility: CLINIC | Age: 84
End: 2019-05-30

## 2019-06-01 ENCOUNTER — COMMUNICATION - HEALTHEAST (OUTPATIENT)
Dept: INTERNAL MEDICINE | Facility: CLINIC | Age: 84
End: 2019-06-01

## 2019-06-01 DIAGNOSIS — L40.9 PSORIASIS: ICD-10-CM

## 2019-06-01 RX ORDER — CALCIPOTRIENE 0.05 MG/ML
SOLUTION TOPICAL
Qty: 60 ML | Refills: 0 | Status: SHIPPED | OUTPATIENT
Start: 2019-06-01

## 2019-06-04 ENCOUNTER — COMMUNICATION - HEALTHEAST (OUTPATIENT)
Dept: ONCOLOGY | Facility: CLINIC | Age: 84
End: 2019-06-04

## 2019-06-28 ENCOUNTER — HOSPITAL ENCOUNTER (OUTPATIENT)
Dept: ULTRASOUND IMAGING | Facility: CLINIC | Age: 84
Setting detail: RADIATION/ONCOLOGY SERIES
Discharge: STILL ADMITTED OR ADMINISTRATIVE | End: 2019-06-28
Attending: INTERNAL MEDICINE

## 2019-06-28 DIAGNOSIS — C50.311 MALIGNANT NEOPLASM OF LOWER-INNER QUADRANT OF RIGHT BREAST OF FEMALE, ESTROGEN RECEPTOR POSITIVE (H): ICD-10-CM

## 2019-06-28 DIAGNOSIS — Z17.0 MALIGNANT NEOPLASM OF LOWER-INNER QUADRANT OF RIGHT BREAST OF FEMALE, ESTROGEN RECEPTOR POSITIVE (H): ICD-10-CM

## 2019-06-28 DIAGNOSIS — J90 PLEURAL EFFUSION: ICD-10-CM

## 2019-07-15 ENCOUNTER — HOSPITAL ENCOUNTER (OUTPATIENT)
Dept: ULTRASOUND IMAGING | Facility: CLINIC | Age: 84
Setting detail: RADIATION/ONCOLOGY SERIES
Discharge: STILL ADMITTED OR ADMINISTRATIVE | End: 2019-07-15
Attending: INTERNAL MEDICINE

## 2019-07-15 ENCOUNTER — AMBULATORY - HEALTHEAST (OUTPATIENT)
Dept: LAB | Facility: CLINIC | Age: 84
End: 2019-07-15

## 2019-07-15 DIAGNOSIS — J90 PLEURAL EFFUSION: ICD-10-CM

## 2019-07-15 DIAGNOSIS — C50.311 MALIGNANT NEOPLASM OF LOWER-INNER QUADRANT OF RIGHT BREAST OF FEMALE, ESTROGEN RECEPTOR POSITIVE (H): ICD-10-CM

## 2019-07-15 DIAGNOSIS — Z17.0 MALIGNANT NEOPLASM OF LOWER-INNER QUADRANT OF RIGHT BREAST OF FEMALE, ESTROGEN RECEPTOR POSITIVE (H): ICD-10-CM

## 2019-07-15 LAB
INR PPP: 1 (ref 0.9–1.1)
PLATELET # BLD AUTO: 371 THOU/UL (ref 140–440)

## 2019-07-18 ENCOUNTER — OFFICE VISIT - HEALTHEAST (OUTPATIENT)
Dept: INTERNAL MEDICINE | Facility: CLINIC | Age: 84
End: 2019-07-18

## 2019-07-18 DIAGNOSIS — C50.311 MALIGNANT NEOPLASM OF LOWER-INNER QUADRANT OF RIGHT FEMALE BREAST, UNSPECIFIED ESTROGEN RECEPTOR STATUS (H): ICD-10-CM

## 2019-07-18 DIAGNOSIS — I89.0 LYMPHEDEMA OF ARM: ICD-10-CM

## 2019-07-18 DIAGNOSIS — J90 PLEURAL EFFUSION: ICD-10-CM

## 2019-07-18 DIAGNOSIS — C48.0 MALIGNANT NEOPLASM OF RETROPERITONEUM (H): ICD-10-CM

## 2019-07-18 ASSESSMENT — MIFFLIN-ST. JEOR: SCORE: 1009.68

## 2019-07-24 ENCOUNTER — OFFICE VISIT - HEALTHEAST (OUTPATIENT)
Dept: ONCOLOGY | Facility: CLINIC | Age: 84
End: 2019-07-24

## 2019-07-24 ENCOUNTER — AMBULATORY - HEALTHEAST (OUTPATIENT)
Dept: ONCOLOGY | Facility: CLINIC | Age: 84
End: 2019-07-24

## 2019-07-24 ENCOUNTER — AMBULATORY - HEALTHEAST (OUTPATIENT)
Dept: INFUSION THERAPY | Facility: CLINIC | Age: 84
End: 2019-07-24

## 2019-07-24 DIAGNOSIS — C50.311 MALIGNANT NEOPLASM OF LOWER-INNER QUADRANT OF RIGHT BREAST OF FEMALE, ESTROGEN RECEPTOR POSITIVE (H): ICD-10-CM

## 2019-07-24 DIAGNOSIS — Z79.899 ENCOUNTER FOR MEDICATION MANAGEMENT: ICD-10-CM

## 2019-07-24 DIAGNOSIS — C50.311 MALIGNANT NEOPLASM OF LOWER-INNER QUADRANT OF RIGHT FEMALE BREAST, UNSPECIFIED ESTROGEN RECEPTOR STATUS (H): ICD-10-CM

## 2019-07-24 DIAGNOSIS — Z79.811 AROMATASE INHIBITOR USE: ICD-10-CM

## 2019-07-24 DIAGNOSIS — Z17.0 MALIGNANT NEOPLASM OF LOWER-INNER QUADRANT OF RIGHT BREAST OF FEMALE, ESTROGEN RECEPTOR POSITIVE (H): ICD-10-CM

## 2019-07-24 LAB
ALBUMIN SERPL-MCNC: 2.9 G/DL (ref 3.5–5)
ALP SERPL-CCNC: 77 U/L (ref 45–120)
ALT SERPL W P-5'-P-CCNC: 18 U/L (ref 0–45)
ANION GAP SERPL CALCULATED.3IONS-SCNC: 12 MMOL/L (ref 5–18)
AST SERPL W P-5'-P-CCNC: 17 U/L (ref 0–40)
BASOPHILS # BLD AUTO: 0.1 THOU/UL (ref 0–0.2)
BASOPHILS NFR BLD AUTO: 1 % (ref 0–2)
BILIRUB SERPL-MCNC: 0.2 MG/DL (ref 0–1)
BUN SERPL-MCNC: 26 MG/DL (ref 8–28)
CALCIUM SERPL-MCNC: 9.7 MG/DL (ref 8.5–10.5)
CHLORIDE BLD-SCNC: 103 MMOL/L (ref 98–107)
CO2 SERPL-SCNC: 23 MMOL/L (ref 22–31)
CREAT SERPL-MCNC: 0.86 MG/DL (ref 0.6–1.1)
EOSINOPHIL # BLD AUTO: 0.2 THOU/UL (ref 0–0.4)
EOSINOPHIL NFR BLD AUTO: 2 % (ref 0–6)
ERYTHROCYTE [DISTWIDTH] IN BLOOD BY AUTOMATED COUNT: 13.4 % (ref 11–14.5)
GFR SERPL CREATININE-BSD FRML MDRD: >60 ML/MIN/1.73M2
GLUCOSE BLD-MCNC: 108 MG/DL (ref 70–125)
HCT VFR BLD AUTO: 39.8 % (ref 35–47)
HGB BLD-MCNC: 13 G/DL (ref 12–16)
LYMPHOCYTES # BLD AUTO: 1.9 THOU/UL (ref 0.8–4.4)
LYMPHOCYTES NFR BLD AUTO: 20 % (ref 20–40)
MCH RBC QN AUTO: 30.2 PG (ref 27–34)
MCHC RBC AUTO-ENTMCNC: 32.7 G/DL (ref 32–36)
MCV RBC AUTO: 92 FL (ref 80–100)
MONOCYTES # BLD AUTO: 0.7 THOU/UL (ref 0–0.9)
MONOCYTES NFR BLD AUTO: 8 % (ref 2–10)
NEUTROPHILS # BLD AUTO: 6.7 THOU/UL (ref 2–7.7)
NEUTROPHILS NFR BLD AUTO: 70 % (ref 50–70)
PLATELET # BLD AUTO: 355 THOU/UL (ref 140–440)
PMV BLD AUTO: 9.6 FL (ref 8.5–12.5)
POTASSIUM BLD-SCNC: 4.5 MMOL/L (ref 3.5–5)
PROT SERPL-MCNC: 7.3 G/DL (ref 6–8)
RBC # BLD AUTO: 4.31 MILL/UL (ref 3.8–5.4)
SODIUM SERPL-SCNC: 138 MMOL/L (ref 136–145)
WBC: 9.6 THOU/UL (ref 4–11)

## 2019-07-29 ENCOUNTER — HOSPITAL ENCOUNTER (OUTPATIENT)
Dept: ULTRASOUND IMAGING | Facility: CLINIC | Age: 84
Setting detail: RADIATION/ONCOLOGY SERIES
Discharge: STILL ADMITTED OR ADMINISTRATIVE | End: 2019-07-29
Attending: INTERNAL MEDICINE

## 2019-07-29 DIAGNOSIS — Z17.0 MALIGNANT NEOPLASM OF LOWER-INNER QUADRANT OF RIGHT BREAST OF FEMALE, ESTROGEN RECEPTOR POSITIVE (H): ICD-10-CM

## 2019-07-29 DIAGNOSIS — C50.311 MALIGNANT NEOPLASM OF LOWER-INNER QUADRANT OF RIGHT BREAST OF FEMALE, ESTROGEN RECEPTOR POSITIVE (H): ICD-10-CM

## 2019-07-29 DIAGNOSIS — J90 PLEURAL EFFUSION: ICD-10-CM

## 2019-08-14 ENCOUNTER — HOSPITAL ENCOUNTER (OUTPATIENT)
Dept: ULTRASOUND IMAGING | Facility: CLINIC | Age: 84
Setting detail: RADIATION/ONCOLOGY SERIES
Discharge: STILL ADMITTED OR ADMINISTRATIVE | End: 2019-08-14
Attending: INTERNAL MEDICINE

## 2019-08-14 DIAGNOSIS — J90 PLEURAL EFFUSION: ICD-10-CM

## 2019-08-14 DIAGNOSIS — C50.311 MALIGNANT NEOPLASM OF LOWER-INNER QUADRANT OF RIGHT BREAST OF FEMALE, ESTROGEN RECEPTOR POSITIVE (H): ICD-10-CM

## 2019-08-14 DIAGNOSIS — Z17.0 MALIGNANT NEOPLASM OF LOWER-INNER QUADRANT OF RIGHT BREAST OF FEMALE, ESTROGEN RECEPTOR POSITIVE (H): ICD-10-CM

## 2019-08-20 ENCOUNTER — AMBULATORY - HEALTHEAST (OUTPATIENT)
Dept: ULTRASOUND IMAGING | Facility: CLINIC | Age: 84
End: 2019-08-20

## 2019-08-27 ENCOUNTER — HOSPITAL ENCOUNTER (OUTPATIENT)
Dept: ULTRASOUND IMAGING | Facility: CLINIC | Age: 84
Setting detail: RADIATION/ONCOLOGY SERIES
Discharge: STILL ADMITTED OR ADMINISTRATIVE | End: 2019-08-27
Attending: INTERNAL MEDICINE

## 2019-08-27 ENCOUNTER — HOSPITAL ENCOUNTER (OUTPATIENT)
Dept: CT IMAGING | Facility: CLINIC | Age: 84
Setting detail: RADIATION/ONCOLOGY SERIES
Discharge: STILL ADMITTED OR ADMINISTRATIVE | End: 2019-08-27
Attending: INTERNAL MEDICINE

## 2019-08-27 DIAGNOSIS — C50.311 MALIGNANT NEOPLASM OF LOWER-INNER QUADRANT OF RIGHT BREAST OF FEMALE, ESTROGEN RECEPTOR POSITIVE (H): ICD-10-CM

## 2019-08-27 DIAGNOSIS — J90 PLEURAL EFFUSION: ICD-10-CM

## 2019-08-27 DIAGNOSIS — Z17.0 MALIGNANT NEOPLASM OF LOWER-INNER QUADRANT OF RIGHT BREAST OF FEMALE, ESTROGEN RECEPTOR POSITIVE (H): ICD-10-CM

## 2019-08-27 LAB
CREAT BLD-MCNC: 1.1 MG/DL (ref 0.6–1.1)
GFR SERPL CREATININE-BSD FRML MDRD: 47 ML/MIN/1.73M2
INR PPP: 1.02 (ref 0.9–1.1)
PLATELET # BLD AUTO: 370 THOU/UL (ref 140–440)

## 2019-08-29 ENCOUNTER — OFFICE VISIT - HEALTHEAST (OUTPATIENT)
Dept: ONCOLOGY | Facility: CLINIC | Age: 84
End: 2019-08-29

## 2019-08-29 ENCOUNTER — AMBULATORY - HEALTHEAST (OUTPATIENT)
Dept: ONCOLOGY | Facility: HOSPITAL | Age: 84
End: 2019-08-29

## 2019-08-29 ENCOUNTER — AMBULATORY - HEALTHEAST (OUTPATIENT)
Dept: INFUSION THERAPY | Facility: CLINIC | Age: 84
End: 2019-08-29

## 2019-08-29 DIAGNOSIS — Z79.899 ENCOUNTER FOR MEDICATION MANAGEMENT: ICD-10-CM

## 2019-08-29 DIAGNOSIS — C50.311 MALIGNANT NEOPLASM OF LOWER-INNER QUADRANT OF RIGHT BREAST OF FEMALE, ESTROGEN RECEPTOR POSITIVE (H): ICD-10-CM

## 2019-08-29 DIAGNOSIS — Z17.0 MALIGNANT NEOPLASM OF LOWER-INNER QUADRANT OF RIGHT BREAST OF FEMALE, ESTROGEN RECEPTOR POSITIVE (H): ICD-10-CM

## 2019-08-29 DIAGNOSIS — Z79.811 AROMATASE INHIBITOR USE: ICD-10-CM

## 2019-08-29 DIAGNOSIS — C50.311 MALIGNANT NEOPLASM OF LOWER-INNER QUADRANT OF RIGHT FEMALE BREAST, UNSPECIFIED ESTROGEN RECEPTOR STATUS (H): ICD-10-CM

## 2019-08-29 DIAGNOSIS — J90 PLEURAL EFFUSION: ICD-10-CM

## 2019-08-29 LAB
ALBUMIN SERPL-MCNC: 2.6 G/DL (ref 3.5–5)
ALP SERPL-CCNC: 72 U/L (ref 45–120)
ALT SERPL W P-5'-P-CCNC: 18 U/L (ref 0–45)
ANION GAP SERPL CALCULATED.3IONS-SCNC: 8 MMOL/L (ref 5–18)
AST SERPL W P-5'-P-CCNC: 15 U/L (ref 0–40)
BASOPHILS # BLD AUTO: 0.1 THOU/UL (ref 0–0.2)
BASOPHILS NFR BLD AUTO: 1 % (ref 0–2)
BILIRUB SERPL-MCNC: 0.2 MG/DL (ref 0–1)
BUN SERPL-MCNC: 27 MG/DL (ref 8–28)
CALCIUM SERPL-MCNC: 9.6 MG/DL (ref 8.5–10.5)
CHLORIDE BLD-SCNC: 100 MMOL/L (ref 98–107)
CO2 SERPL-SCNC: 29 MMOL/L (ref 22–31)
CREAT SERPL-MCNC: 0.98 MG/DL (ref 0.6–1.1)
EOSINOPHIL # BLD AUTO: 0.1 THOU/UL (ref 0–0.4)
EOSINOPHIL NFR BLD AUTO: 1 % (ref 0–6)
ERYTHROCYTE [DISTWIDTH] IN BLOOD BY AUTOMATED COUNT: 13.6 % (ref 11–14.5)
GFR SERPL CREATININE-BSD FRML MDRD: 54 ML/MIN/1.73M2
GLUCOSE BLD-MCNC: 130 MG/DL (ref 70–125)
HCT VFR BLD AUTO: 42.2 % (ref 35–47)
HGB BLD-MCNC: 13.6 G/DL (ref 12–16)
LYMPHOCYTES # BLD AUTO: 1.1 THOU/UL (ref 0.8–4.4)
LYMPHOCYTES NFR BLD AUTO: 12 % (ref 20–40)
MCH RBC QN AUTO: 29.9 PG (ref 27–34)
MCHC RBC AUTO-ENTMCNC: 32.2 G/DL (ref 32–36)
MCV RBC AUTO: 93 FL (ref 80–100)
MONOCYTES # BLD AUTO: 0.6 THOU/UL (ref 0–0.9)
MONOCYTES NFR BLD AUTO: 7 % (ref 2–10)
NEUTROPHILS # BLD AUTO: 6.9 THOU/UL (ref 2–7.7)
NEUTROPHILS NFR BLD AUTO: 79 % (ref 50–70)
PLATELET # BLD AUTO: 400 THOU/UL (ref 140–440)
PMV BLD AUTO: 9.4 FL (ref 8.5–12.5)
POTASSIUM BLD-SCNC: 4.3 MMOL/L (ref 3.5–5)
PROT SERPL-MCNC: 7 G/DL (ref 6–8)
RBC # BLD AUTO: 4.55 MILL/UL (ref 3.8–5.4)
SODIUM SERPL-SCNC: 137 MMOL/L (ref 136–145)
WBC: 8.8 THOU/UL (ref 4–11)

## 2019-08-30 ENCOUNTER — COMMUNICATION - HEALTHEAST (OUTPATIENT)
Dept: ONCOLOGY | Facility: HOSPITAL | Age: 84
End: 2019-08-30

## 2019-09-05 ENCOUNTER — INFUSION - HEALTHEAST (OUTPATIENT)
Dept: INFUSION THERAPY | Facility: CLINIC | Age: 84
End: 2019-09-05

## 2019-09-05 DIAGNOSIS — Z79.899 ENCOUNTER FOR MEDICATION MANAGEMENT: ICD-10-CM

## 2019-09-05 DIAGNOSIS — R06.09 DYSPNEA ON EXERTION: ICD-10-CM

## 2019-09-05 DIAGNOSIS — C50.311 MALIGNANT NEOPLASM OF LOWER-INNER QUADRANT OF RIGHT FEMALE BREAST, UNSPECIFIED ESTROGEN RECEPTOR STATUS (H): ICD-10-CM

## 2019-09-05 LAB
BASOPHILS # BLD AUTO: 0.1 THOU/UL (ref 0–0.2)
BASOPHILS NFR BLD AUTO: 1 % (ref 0–2)
EOSINOPHIL # BLD AUTO: 0.1 THOU/UL (ref 0–0.4)
EOSINOPHIL NFR BLD AUTO: 1 % (ref 0–6)
ERYTHROCYTE [DISTWIDTH] IN BLOOD BY AUTOMATED COUNT: 13.6 % (ref 11–14.5)
HCT VFR BLD AUTO: 43.4 % (ref 35–47)
HGB BLD-MCNC: 14 G/DL (ref 12–16)
LYMPHOCYTES # BLD AUTO: 1.4 THOU/UL (ref 0.8–4.4)
LYMPHOCYTES NFR BLD AUTO: 14 % (ref 20–40)
MCH RBC QN AUTO: 29.7 PG (ref 27–34)
MCHC RBC AUTO-ENTMCNC: 32.3 G/DL (ref 32–36)
MCV RBC AUTO: 92 FL (ref 80–100)
MONOCYTES # BLD AUTO: 0.7 THOU/UL (ref 0–0.9)
MONOCYTES NFR BLD AUTO: 7 % (ref 2–10)
NEUTROPHILS # BLD AUTO: 7.7 THOU/UL (ref 2–7.7)
NEUTROPHILS NFR BLD AUTO: 77 % (ref 50–70)
PLATELET # BLD AUTO: 440 THOU/UL (ref 140–440)
PMV BLD AUTO: 9.5 FL (ref 8.5–12.5)
RBC # BLD AUTO: 4.72 MILL/UL (ref 3.8–5.4)
WBC: 9.9 THOU/UL (ref 4–11)

## 2019-09-10 ENCOUNTER — INFUSION - HEALTHEAST (OUTPATIENT)
Dept: INFUSION THERAPY | Facility: CLINIC | Age: 84
End: 2019-09-10

## 2019-09-10 ENCOUNTER — HOSPITAL ENCOUNTER (OUTPATIENT)
Dept: ULTRASOUND IMAGING | Facility: CLINIC | Age: 84
Setting detail: RADIATION/ONCOLOGY SERIES
Discharge: STILL ADMITTED OR ADMINISTRATIVE | End: 2019-09-10
Attending: INTERNAL MEDICINE

## 2019-09-10 DIAGNOSIS — Z17.0 MALIGNANT NEOPLASM OF LOWER-INNER QUADRANT OF RIGHT BREAST OF FEMALE, ESTROGEN RECEPTOR POSITIVE (H): ICD-10-CM

## 2019-09-10 DIAGNOSIS — C50.311 MALIGNANT NEOPLASM OF LOWER-INNER QUADRANT OF RIGHT FEMALE BREAST, UNSPECIFIED ESTROGEN RECEPTOR STATUS (H): ICD-10-CM

## 2019-09-10 DIAGNOSIS — Z79.899 ENCOUNTER FOR MEDICATION MANAGEMENT: ICD-10-CM

## 2019-09-10 DIAGNOSIS — C50.311 MALIGNANT NEOPLASM OF LOWER-INNER QUADRANT OF RIGHT BREAST OF FEMALE, ESTROGEN RECEPTOR POSITIVE (H): ICD-10-CM

## 2019-09-10 DIAGNOSIS — J90 PLEURAL EFFUSION: ICD-10-CM

## 2019-09-10 LAB
BASOPHILS # BLD AUTO: 0.2 THOU/UL (ref 0–0.2)
BASOPHILS NFR BLD AUTO: 2 % (ref 0–2)
EOSINOPHIL COUNT (ABSOLUTE): 0.1 THOU/UL (ref 0–0.4)
EOSINOPHIL NFR BLD AUTO: 1 % (ref 0–6)
ERYTHROCYTE [DISTWIDTH] IN BLOOD BY AUTOMATED COUNT: 13.8 % (ref 11–14.5)
HCT VFR BLD AUTO: 43.4 % (ref 35–47)
HGB BLD-MCNC: 13.7 G/DL (ref 12–16)
LYMPHOCYTES # BLD AUTO: 1.5 THOU/UL (ref 0.8–4.4)
LYMPHOCYTES NFR BLD AUTO: 16 % (ref 20–40)
MCH RBC QN AUTO: 29.3 PG (ref 27–34)
MCHC RBC AUTO-ENTMCNC: 31.6 G/DL (ref 32–36)
MCV RBC AUTO: 93 FL (ref 80–100)
MONOCYTES # BLD AUTO: 0.2 THOU/UL (ref 0–0.9)
MONOCYTES NFR BLD AUTO: 2 % (ref 2–10)
PLAT MORPH BLD: ABNORMAL
PLATELET # BLD AUTO: 426 THOU/UL (ref 140–440)
PMV BLD AUTO: 9.5 FL (ref 8.5–12.5)
RBC # BLD AUTO: 4.67 MILL/UL (ref 3.8–5.4)
TOTAL NEUTROPHILS-ABS(DIFF): 7.5 THOU/UL (ref 2–7.7)
TOTAL NEUTROPHILS-REL(DIFF): 79 % (ref 50–70)
WBC: 9.5 THOU/UL (ref 4–11)

## 2019-09-12 ENCOUNTER — OFFICE VISIT - HEALTHEAST (OUTPATIENT)
Dept: PULMONOLOGY | Facility: OTHER | Age: 84
End: 2019-09-12

## 2019-09-12 DIAGNOSIS — C50.919 METASTATIC BREAST CANCER: ICD-10-CM

## 2019-09-12 DIAGNOSIS — J94.8 HYDROPNEUMOTHORAX: ICD-10-CM

## 2019-09-12 DIAGNOSIS — J91.0 MALIGNANT PLEURAL EFFUSION (H): ICD-10-CM

## 2019-09-12 DIAGNOSIS — J98.19 TRAPPED LUNG: ICD-10-CM

## 2019-09-12 ASSESSMENT — MIFFLIN-ST. JEOR: SCORE: 985.64

## 2019-09-17 ENCOUNTER — INFUSION - HEALTHEAST (OUTPATIENT)
Dept: INFUSION THERAPY | Facility: CLINIC | Age: 84
End: 2019-09-17

## 2019-09-17 DIAGNOSIS — C50.311 MALIGNANT NEOPLASM OF LOWER-INNER QUADRANT OF RIGHT FEMALE BREAST, UNSPECIFIED ESTROGEN RECEPTOR STATUS (H): ICD-10-CM

## 2019-09-17 DIAGNOSIS — Z79.899 ENCOUNTER FOR MEDICATION MANAGEMENT: ICD-10-CM

## 2019-09-17 LAB
BASOPHILS # BLD AUTO: 0 THOU/UL (ref 0–0.2)
BASOPHILS NFR BLD AUTO: 1 % (ref 0–2)
EOSINOPHIL # BLD AUTO: 0.1 THOU/UL (ref 0–0.4)
EOSINOPHIL NFR BLD AUTO: 1 % (ref 0–6)
ERYTHROCYTE [DISTWIDTH] IN BLOOD BY AUTOMATED COUNT: 13.8 % (ref 11–14.5)
HCT VFR BLD AUTO: 41.7 % (ref 35–47)
HGB BLD-MCNC: 13.3 G/DL (ref 12–16)
LYMPHOCYTES # BLD AUTO: 1.4 THOU/UL (ref 0.8–4.4)
LYMPHOCYTES NFR BLD AUTO: 32 % (ref 20–40)
MCH RBC QN AUTO: 29.6 PG (ref 27–34)
MCHC RBC AUTO-ENTMCNC: 31.9 G/DL (ref 32–36)
MCV RBC AUTO: 93 FL (ref 80–100)
MONOCYTES # BLD AUTO: 0.1 THOU/UL (ref 0–0.9)
MONOCYTES NFR BLD AUTO: 3 % (ref 2–10)
NEUTROPHILS # BLD AUTO: 2.7 THOU/UL (ref 2–7.7)
NEUTROPHILS NFR BLD AUTO: 63 % (ref 50–70)
PLAT MORPH BLD: NORMAL
PLATELET # BLD AUTO: 256 THOU/UL (ref 140–440)
PMV BLD AUTO: 9.4 FL (ref 8.5–12.5)
RBC # BLD AUTO: 4.5 MILL/UL (ref 3.8–5.4)
WBC: 4.3 THOU/UL (ref 4–11)

## 2019-09-18 ENCOUNTER — COMMUNICATION - HEALTHEAST (OUTPATIENT)
Dept: ONCOLOGY | Facility: CLINIC | Age: 84
End: 2019-09-18

## 2019-09-18 DIAGNOSIS — C50.311 MALIGNANT NEOPLASM OF LOWER-INNER QUADRANT OF RIGHT BREAST OF FEMALE, ESTROGEN RECEPTOR POSITIVE (H): ICD-10-CM

## 2019-09-18 DIAGNOSIS — Z17.0 MALIGNANT NEOPLASM OF LOWER-INNER QUADRANT OF RIGHT BREAST OF FEMALE, ESTROGEN RECEPTOR POSITIVE (H): ICD-10-CM

## 2019-09-19 ENCOUNTER — COMMUNICATION - HEALTHEAST (OUTPATIENT)
Dept: ONCOLOGY | Facility: CLINIC | Age: 84
End: 2019-09-19

## 2019-09-19 ENCOUNTER — AMBULATORY - HEALTHEAST (OUTPATIENT)
Dept: PULMONOLOGY | Facility: OTHER | Age: 84
End: 2019-09-19

## 2019-09-19 DIAGNOSIS — C50.919 METASTATIC BREAST CANCER: ICD-10-CM

## 2019-09-20 ENCOUNTER — COMMUNICATION - HEALTHEAST (OUTPATIENT)
Dept: SCHEDULING | Facility: CLINIC | Age: 84
End: 2019-09-20

## 2019-09-25 ENCOUNTER — AMBULATORY - HEALTHEAST (OUTPATIENT)
Dept: INFUSION THERAPY | Facility: CLINIC | Age: 84
End: 2019-09-25

## 2019-09-25 ENCOUNTER — HOSPITAL ENCOUNTER (OUTPATIENT)
Dept: ULTRASOUND IMAGING | Facility: CLINIC | Age: 84
Setting detail: RADIATION/ONCOLOGY SERIES
Discharge: STILL ADMITTED OR ADMINISTRATIVE | End: 2019-09-25
Attending: INTERNAL MEDICINE

## 2019-09-25 ENCOUNTER — COMMUNICATION - HEALTHEAST (OUTPATIENT)
Dept: ONCOLOGY | Facility: CLINIC | Age: 84
End: 2019-09-25

## 2019-09-25 DIAGNOSIS — J90 PLEURAL EFFUSION: ICD-10-CM

## 2019-09-25 DIAGNOSIS — Z17.0 MALIGNANT NEOPLASM OF LOWER-INNER QUADRANT OF RIGHT BREAST OF FEMALE, ESTROGEN RECEPTOR POSITIVE (H): ICD-10-CM

## 2019-09-25 DIAGNOSIS — Z79.811 AROMATASE INHIBITOR USE: ICD-10-CM

## 2019-09-25 DIAGNOSIS — Z79.899 ENCOUNTER FOR MEDICATION MANAGEMENT: ICD-10-CM

## 2019-09-25 DIAGNOSIS — C50.311 MALIGNANT NEOPLASM OF LOWER-INNER QUADRANT OF RIGHT BREAST OF FEMALE, ESTROGEN RECEPTOR POSITIVE (H): ICD-10-CM

## 2019-09-25 DIAGNOSIS — C50.311 MALIGNANT NEOPLASM OF LOWER-INNER QUADRANT OF RIGHT FEMALE BREAST, UNSPECIFIED ESTROGEN RECEPTOR STATUS (H): ICD-10-CM

## 2019-09-25 LAB
ALBUMIN SERPL-MCNC: 2.9 G/DL (ref 3.5–5)
ALP SERPL-CCNC: 72 U/L (ref 45–120)
ALT SERPL W P-5'-P-CCNC: 12 U/L (ref 0–45)
ANION GAP SERPL CALCULATED.3IONS-SCNC: 9 MMOL/L (ref 5–18)
AST SERPL W P-5'-P-CCNC: 12 U/L (ref 0–40)
BASOPHILS # BLD AUTO: 0 THOU/UL (ref 0–0.2)
BASOPHILS NFR BLD AUTO: 2 % (ref 0–2)
BILIRUB SERPL-MCNC: 0.2 MG/DL (ref 0–1)
BUN SERPL-MCNC: 24 MG/DL (ref 8–28)
CALCIUM SERPL-MCNC: 9.7 MG/DL (ref 8.5–10.5)
CHLORIDE BLD-SCNC: 100 MMOL/L (ref 98–107)
CO2 SERPL-SCNC: 28 MMOL/L (ref 22–31)
CREAT SERPL-MCNC: 1.18 MG/DL (ref 0.6–1.1)
EOSINOPHIL # BLD AUTO: 0 THOU/UL (ref 0–0.4)
EOSINOPHIL NFR BLD AUTO: 0 % (ref 0–6)
ERYTHROCYTE [DISTWIDTH] IN BLOOD BY AUTOMATED COUNT: 14.6 % (ref 11–14.5)
GFR SERPL CREATININE-BSD FRML MDRD: 44 ML/MIN/1.73M2
GLUCOSE BLD-MCNC: 112 MG/DL (ref 70–125)
HCT VFR BLD AUTO: 38.9 % (ref 35–47)
HGB BLD-MCNC: 12.7 G/DL (ref 12–16)
LYMPHOCYTES # BLD AUTO: 1 THOU/UL (ref 0.8–4.4)
LYMPHOCYTES NFR BLD AUTO: 36 % (ref 20–40)
MCH RBC QN AUTO: 30.6 PG (ref 27–34)
MCHC RBC AUTO-ENTMCNC: 32.6 G/DL (ref 32–36)
MCV RBC AUTO: 94 FL (ref 80–100)
MONOCYTES # BLD AUTO: 0.2 THOU/UL (ref 0–0.9)
MONOCYTES NFR BLD AUTO: 6 % (ref 2–10)
NEUTROPHILS # BLD AUTO: 1.5 THOU/UL (ref 2–7.7)
NEUTROPHILS NFR BLD AUTO: 56 % (ref 50–70)
PLATELET # BLD AUTO: 190 THOU/UL (ref 140–440)
PMV BLD AUTO: 9.8 FL (ref 8.5–12.5)
POTASSIUM BLD-SCNC: 4.3 MMOL/L (ref 3.5–5)
PROT SERPL-MCNC: 7.7 G/DL (ref 6–8)
RBC # BLD AUTO: 4.15 MILL/UL (ref 3.8–5.4)
SODIUM SERPL-SCNC: 137 MMOL/L (ref 136–145)
WBC: 2.8 THOU/UL (ref 4–11)

## 2019-09-27 ENCOUNTER — HOSPITAL ENCOUNTER (OUTPATIENT)
Dept: INTERVENTIONAL RADIOLOGY/VASCULAR | Facility: CLINIC | Age: 84
Discharge: HOME | End: 2019-09-27
Attending: INTERNAL MEDICINE | Admitting: RADIOLOGY

## 2019-09-27 DIAGNOSIS — J91.0 MALIGNANT PLEURAL EFFUSION (H): ICD-10-CM

## 2019-09-27 ASSESSMENT — MIFFLIN-ST. JEOR: SCORE: 974.24

## 2019-10-01 ENCOUNTER — OFFICE VISIT - HEALTHEAST (OUTPATIENT)
Dept: ONCOLOGY | Facility: CLINIC | Age: 84
End: 2019-10-01

## 2019-10-01 ENCOUNTER — AMBULATORY - HEALTHEAST (OUTPATIENT)
Dept: INFUSION THERAPY | Facility: CLINIC | Age: 84
End: 2019-10-01

## 2019-10-01 ENCOUNTER — INFUSION - HEALTHEAST (OUTPATIENT)
Dept: INFUSION THERAPY | Facility: CLINIC | Age: 84
End: 2019-10-01

## 2019-10-01 DIAGNOSIS — C50.311 MALIGNANT NEOPLASM OF LOWER-INNER QUADRANT OF RIGHT BREAST OF FEMALE, ESTROGEN RECEPTOR POSITIVE (H): ICD-10-CM

## 2019-10-01 DIAGNOSIS — C50.311 MALIGNANT NEOPLASM OF LOWER-INNER QUADRANT OF RIGHT FEMALE BREAST, UNSPECIFIED ESTROGEN RECEPTOR STATUS (H): ICD-10-CM

## 2019-10-01 DIAGNOSIS — Z79.899 ENCOUNTER FOR MEDICATION MANAGEMENT: ICD-10-CM

## 2019-10-01 DIAGNOSIS — Z17.0 MALIGNANT NEOPLASM OF LOWER-INNER QUADRANT OF RIGHT BREAST OF FEMALE, ESTROGEN RECEPTOR POSITIVE (H): ICD-10-CM

## 2019-10-01 DIAGNOSIS — Z79.811 AROMATASE INHIBITOR USE: ICD-10-CM

## 2019-10-01 LAB
ALBUMIN SERPL-MCNC: 2.6 G/DL (ref 3.5–5)
ALP SERPL-CCNC: 81 U/L (ref 45–120)
ALT SERPL W P-5'-P-CCNC: 22 U/L (ref 0–45)
ANION GAP SERPL CALCULATED.3IONS-SCNC: 8 MMOL/L (ref 5–18)
AST SERPL W P-5'-P-CCNC: 18 U/L (ref 0–40)
BASOPHILS # BLD AUTO: 0.1 THOU/UL (ref 0–0.2)
BASOPHILS NFR BLD AUTO: 2 % (ref 0–2)
BILIRUB SERPL-MCNC: 0.2 MG/DL (ref 0–1)
BUN SERPL-MCNC: 18 MG/DL (ref 8–28)
CALCIUM SERPL-MCNC: 9.2 MG/DL (ref 8.5–10.5)
CHLORIDE BLD-SCNC: 100 MMOL/L (ref 98–107)
CO2 SERPL-SCNC: 28 MMOL/L (ref 22–31)
CREAT SERPL-MCNC: 0.98 MG/DL (ref 0.6–1.1)
EOSINOPHIL # BLD AUTO: 0 THOU/UL (ref 0–0.4)
EOSINOPHIL NFR BLD AUTO: 1 % (ref 0–6)
ERYTHROCYTE [DISTWIDTH] IN BLOOD BY AUTOMATED COUNT: 15.9 % (ref 11–14.5)
GFR SERPL CREATININE-BSD FRML MDRD: 54 ML/MIN/1.73M2
GLUCOSE BLD-MCNC: 145 MG/DL (ref 70–125)
HCT VFR BLD AUTO: 36.1 % (ref 35–47)
HGB BLD-MCNC: 11.8 G/DL (ref 12–16)
LYMPHOCYTES # BLD AUTO: 1.1 THOU/UL (ref 0.8–4.4)
LYMPHOCYTES NFR BLD AUTO: 26 % (ref 20–40)
MCH RBC QN AUTO: 30.4 PG (ref 27–34)
MCHC RBC AUTO-ENTMCNC: 32.7 G/DL (ref 32–36)
MCV RBC AUTO: 93 FL (ref 80–100)
MONOCYTES # BLD AUTO: 0.5 THOU/UL (ref 0–0.9)
MONOCYTES NFR BLD AUTO: 12 % (ref 2–10)
NEUTROPHILS # BLD AUTO: 2.5 THOU/UL (ref 2–7.7)
NEUTROPHILS NFR BLD AUTO: 59 % (ref 50–70)
PLATELET # BLD AUTO: 460 THOU/UL (ref 140–440)
PMV BLD AUTO: 8.9 FL (ref 8.5–12.5)
POTASSIUM BLD-SCNC: 4.1 MMOL/L (ref 3.5–5)
PROT SERPL-MCNC: 7.3 G/DL (ref 6–8)
RBC # BLD AUTO: 3.88 MILL/UL (ref 3.8–5.4)
SODIUM SERPL-SCNC: 136 MMOL/L (ref 136–145)
WBC: 4.3 THOU/UL (ref 4–11)

## 2019-10-02 ENCOUNTER — OFFICE VISIT - HEALTHEAST (OUTPATIENT)
Dept: PULMONOLOGY | Facility: OTHER | Age: 84
End: 2019-10-02

## 2019-10-02 ENCOUNTER — COMMUNICATION - HEALTHEAST (OUTPATIENT)
Dept: ONCOLOGY | Facility: CLINIC | Age: 84
End: 2019-10-02

## 2019-10-02 DIAGNOSIS — R06.02 SHORTNESS OF BREATH: ICD-10-CM

## 2019-10-02 DIAGNOSIS — C50.919 METASTATIC BREAST CANCER: ICD-10-CM

## 2019-10-02 DIAGNOSIS — Z51.5 ENCOUNTER FOR PALLIATIVE CARE: ICD-10-CM

## 2019-10-02 DIAGNOSIS — G89.3 CANCER RELATED PAIN: ICD-10-CM

## 2019-10-02 ASSESSMENT — MIFFLIN-ST. JEOR: SCORE: 993.8

## 2019-10-03 RX ORDER — MORPHINE SULFATE 10 MG/5ML
2.5 SOLUTION ORAL
Qty: 100 ML | Refills: 0 | Status: SHIPPED | OUTPATIENT
Start: 2019-10-03

## 2019-10-22 ENCOUNTER — AMBULATORY - HEALTHEAST (OUTPATIENT)
Dept: OTHER | Facility: CLINIC | Age: 84
End: 2019-10-22

## 2019-10-29 ENCOUNTER — RECORDS - HEALTHEAST (OUTPATIENT)
Dept: ADMINISTRATIVE | Facility: OTHER | Age: 84
End: 2019-10-29

## 2019-11-04 ENCOUNTER — RECORDS - HEALTHEAST (OUTPATIENT)
Dept: ADMINISTRATIVE | Facility: OTHER | Age: 84
End: 2019-11-04

## 2019-11-14 ENCOUNTER — RECORDS - HEALTHEAST (OUTPATIENT)
Dept: ADMINISTRATIVE | Facility: OTHER | Age: 84
End: 2019-11-14

## 2020-01-02 ENCOUNTER — COMMUNICATION - HEALTHEAST (OUTPATIENT)
Dept: INTERNAL MEDICINE | Facility: CLINIC | Age: 85
End: 2020-01-02

## 2020-01-02 ENCOUNTER — COMMUNICATION - HEALTHEAST (OUTPATIENT)
Dept: ONCOLOGY | Facility: CLINIC | Age: 85
End: 2020-01-02

## 2021-05-26 ENCOUNTER — RECORDS - HEALTHEAST (OUTPATIENT)
Dept: ADMINISTRATIVE | Facility: CLINIC | Age: 86
End: 2021-05-26

## 2021-05-26 VITALS
DIASTOLIC BLOOD PRESSURE: 74 MMHG | SYSTOLIC BLOOD PRESSURE: 118 MMHG | TEMPERATURE: 97.6 F | HEART RATE: 119 BPM | OXYGEN SATURATION: 97 %

## 2021-05-26 VITALS
DIASTOLIC BLOOD PRESSURE: 58 MMHG | OXYGEN SATURATION: 95 % | TEMPERATURE: 97.8 F | HEART RATE: 105 BPM | SYSTOLIC BLOOD PRESSURE: 128 MMHG

## 2021-05-26 VITALS
HEART RATE: 92 BPM | SYSTOLIC BLOOD PRESSURE: 145 MMHG | DIASTOLIC BLOOD PRESSURE: 82 MMHG | OXYGEN SATURATION: 97 % | TEMPERATURE: 97.9 F

## 2021-05-27 ENCOUNTER — RECORDS - HEALTHEAST (OUTPATIENT)
Dept: ADMINISTRATIVE | Facility: CLINIC | Age: 86
End: 2021-05-27

## 2021-05-27 NOTE — PATIENT INSTRUCTIONS - HE
Recent Results (from the past 24 hour(s))   HM1 (CBC with Diff)   Result Value Ref Range    WBC 8.8 4.0 - 11.0 thou/uL    RBC 4.44 3.80 - 5.40 mill/uL    Hemoglobin 13.4 12.0 - 16.0 g/dL    Hematocrit 41.8 35.0 - 47.0 %    MCV 94 80 - 100 fL    MCH 30.2 27.0 - 34.0 pg    MCHC 32.1 32.0 - 36.0 g/dL    RDW 13.5 11.0 - 14.5 %    Platelets 330 140 - 440 thou/uL    MPV 9.5 8.5 - 12.5 fL    Neutrophils % 69 50 - 70 %    Lymphocytes % 18 (L) 20 - 40 %    Monocytes % 8 2 - 10 %    Eosinophils % 4 0 - 6 %    Basophils % 1 0 - 2 %    Neutrophils Absolute 6.1 2.0 - 7.7 thou/uL    Lymphocytes Absolute 1.6 0.8 - 4.4 thou/uL    Monocytes Absolute 0.7 0.0 - 0.9 thou/uL    Eosinophils Absolute 0.4 0.0 - 0.4 thou/uL    Basophils Absolute 0.1 0.0 - 0.2 thou/uL

## 2021-05-27 NOTE — TELEPHONE ENCOUNTER
Patient called, wanting to update us on how much calcium is in her multivitamin.  She reports 1000mg of calcium per tablet and she takes 1 tablet two times a day.  She also reports she is taking biotin 2500 mcg daily.  Med list updated/HAYLEY Sanz RN

## 2021-05-27 NOTE — PROGRESS NOTES
Patient here for follow-up with NP for breast cancer.  Reports feeling very well over the last week.

## 2021-05-27 NOTE — PROGRESS NOTES
I met with Humera today following her visit with Sebastián to check in and offer support.  She said she is doing quite well.  She is tolerating her letrozole well.  She feels good.  Denies needs.  Support provided.  I gave her my card again as she didn't know where she had it and encouraged calls.

## 2021-05-27 NOTE — TELEPHONE ENCOUNTER
Patient called back, says she has been doing some deep breathing exercises and feels better.   Isabel Nails RN

## 2021-05-27 NOTE — TELEPHONE ENCOUNTER
I called Humera and asked about her calcium dose and she said it was 2000 mg/day. I thanked her for that information. Dori

## 2021-05-27 NOTE — TELEPHONE ENCOUNTER
Dr Zhang, please be aware.   Patient called in, she said, starting today, she feels chest heaviness. Similar to when she has needed thoracentesis (last drained 3/15/19).   Cough is slight and unchanged.   She denies fever, no nausea, vomiting, she is able to eat normally and is talking in complete sentences.   Says she will wait and call in over weekend, if sxs worsen.   Isabel Nails RN

## 2021-05-27 NOTE — TELEPHONE ENCOUNTER
0808:  Patient called and left message wanting to schedule an appt to have fluid drained from her lung. She asked for a call back to [phone]/HAYLEY Sanz RN     0863:  I called patient back. Patient reports feels like fluid collecting in lung.  Increased shortness of breath and non-productive cough. States no energy. Symptoms started couple of days ago. I told her I didn't have an order for a thoracentesis so I couldn't get her scheduled now.  I told her I would discuss with Dr. Zhang and call her back/HAYLEY Sanz RN    0911:  Discussed the above with Dr. Zhang. Thoracentesis order placed.  Patient notified.  Patient instructed to go to ER if shortness of breath worsens, CP before she can get in for thoracentesis.  Patient verbalized understanding.  Transferred to ANNETTE Gavin to jair/HAYLEY Sanz RN

## 2021-05-27 NOTE — TELEPHONE ENCOUNTER
Pt was called and given ca 27.29 marker results of 27 down from 62 and this is a great response. Pt also told to increase her protein intake because her chemistries showed low protein. Pt verbalized understanding and happy to hear. Genevieve Garcia RN

## 2021-05-27 NOTE — PROGRESS NOTES
[facility] Hematology and Oncology Progress Note    Patient: Humera Espinal  MRN: [id number]  Date of Service: 04/04/2019        Reason for Visit:    Follow up metastatic breast cancer on palliative endocrine therapy.    Assessment and Plan:    Cancer Staging    1) Malignant neoplasm LIQ (R) female breast (H)   Clinical Stage IV (cT4b, cN1, pM1, GX) - pleura and possible pulmonary nodules   ER - positive @ 97%   RI - positive @ 96%   HER2 by IHC - negative (1+)    85 y.o.     2019, January - had noted cluster of moles and (R) breast not hanging correctly past few months.    01/02/19 - presented to PCP with chest heaviness, cough and chills, no fever.    Placed on Tessalon perles.    01/21/19 annual Derm exam including eval (R) breast:    (R) inferior breast punch biopsy by [facility] c/w metastatic carcinoma c/w breast origin.    01/22/19 XR chest - moderate sized right pleural effusion with right basilar atelectasis and/or airspace disease. Left lung is clear.    01/25/19 diagnostic (B) mammograms with US (R) breast - fungating right 6:00 position breast mass. Additional smaller right 10:00 position breast mass. Suspicious right axillary lymph nodes. No evidence left breast malignancy    01/29/19 XR chest - no change in the right pleural effusion with associated atelectasis or consolidation at the right lung base. No pneumothorax. The left lung is clear. The heart size and pulmonary vascularity are normal.    01/31/19 CT CAP - Malignant right pleural effusion with nodular implants studding the pleural surface. Spiculated posterior lesion within right breast abuts and may invade the chest wall. Numerous very tiny indeterminate pulmonary nodules within left lung.    01/31/19 US (R) thoracentesis - 800 cc of turbid deep suraj-colored fluid were removed and sent to lab.    Cytology - positive for malignant cells c/w metastatic breast cancer.    Positive for CK7, Calretinin, and WT1 immunostains.  "    Many of the atypical cells are positive for GATA3 and Mammaglobin immunostains.     CK7 immunostain positive.     Negative for estrogen receptor, progesterone receptor, and HER2/rolando receptor immunostains.    02/05/19 US (R) thoracentesis - 650 cc suraj fluid were removed and sent to lab.    02/07/19 NM Bone Scan - no evidence of skeletal metastases.    02/07/19 CA 27.29 - elevated @ 62.    03/15/19 US (R) thoracentesis - 850 ml clear yellow fluid were removed and sent to lab.    02/07/19 - initiated palliative endocrine therapy with letrozole.    04/04/19:    CBC - WNL.    CMP - chronic hypoalbuminemia.  Otherwise WNL.    CA 27.29 - now WNL @ 27 (62 at diagnosis)    The patient presents noting a \"big change\" in feeling better starting about a week ago.  She states her appetite is now good.  Her weight is very stable after about a 5 pound weight loss the past 6 months.  She denies pain.  Tolerating letrozole therapy without known side effects.    I reviewed with her that it takes 4-8 weeks to note endocrine therapy effect.  With her biochemical marker significantly reduced and in that she's felt a \"big change\" in feeling much better the past week, about 7 weeks from starting the letrozole, palliative endocrine therapy appears to be effective in controlling her metastatic breast cancer.  She was pleased to hear this.    Continue palliative daily letrozole therapy - opted against palbociclib due to concerns about potential side effects.    Continue daily calcium and vitamin D daily supplements.    She is to alert us if she feels another thoracentesis is needed.    05/02/19 - follow up with HEME2, BMP and CA 27.29.    05/30/19 - restaging CT.     2) Malignant right-sided pleural effusion:    02/14/19 - (R) thoracentesis removed 65 ml pleural fluid.    03/15/19 - (R) thoracentesis removed 850 ml pleural fluid.    Clinically stable.      Advised to call if symptoms indicate need for a thoracentesis.      ECOG " "Performance:     ECOG Performance Status: 1     Distress Assessment:    Distress Assessment Score: No distress    Pain:    Currently in Pain: No/denies        TT > 25 minutes face to face with > 50% counseling and care coordination.    Sebastián RUIZ Abhay, CNP   __________________________________________    Interim History:    Mrs. Espinal presents alone.  She, overall, is doing well.  She's noted a \"big change\" from last week in that she's feeling much better.  Her appetite is good and weight very stable.  She's had a 5 pound weight loss in the past 6 months.  She has no pain.  She's noted no side effects from the letrozole.  She has been doing deep breathing relaxation exercises and not feeling she needs another thoracentesis at this point, last was about 3 weeks ago.  She lives alone,  for the past 13 years but has 2 sons that live nearby and help with anything and everything.  She is using some holistic supplements.  She completed an application for the [facility] residential facility.    Pain Status:    Currently in Pain: No/denies    Review of Systems:    Oncology Nurse Assessment/CMA Intake: Constitutional  Fatigue: None  Fever: None  Chills: None  Weight Gain: None  Weight Loss: None  Neurosensory     Eye      Ear  Ear Disorder (WDL): Exceptions to WDL(floaters occasionally)  Cardiovascular  Cardiovascular (WDL): All cardiovascular elements are within defined limits  Pulmonary  Respiratory (WDL): Within Defined Limits  Gastrointestinal  Gastrointestinal (WDL): All gastrointestinal elements are within defined limits  Genitourinary  Genitourinary (WDL): All genitourinary elements are within defined limits  Lymphatic  Lymph (WDL): All lymph disorder elements are within defined limits  Musculoskeletal and Connective Tissue  Musculoskeletal and Connetive Tissue Disorders (WDL): All Musculoskeletal and Connetive Tissue Disorder elements are within defined limits  Integumentary  Integumentary " (WDL): All integumentary elements are within defined limits  Patient Coping  Patient Coping: Accepting  Distress Assessment  Distress Assessment Score: No distress  Accompanied by  Accompanied by: Alone  Oral Chemo Adherence       Past Histories:    Past Medical History:   Diagnosis Date     Hyperlipidemia      Osteoarthritis      Osteopenia      Osteoporosis      Skin cancer 2017       Physical Exam:    Recent Vitals 3/7/2019   Height -   Weight 137 lbs 2 oz   BSA (m2) 1.68 m2   /66   Pulse 86   Temp 97.7   Temp src 1   SpO2 97   Some recent data might be hidden     General:  Alert.  Pleasant.  No acute distress.  HEENT:  Anicteric sclera.  JESUS.  EOMI.  No mucosal lesions.  Lymphatics:  No abnormally enlarged lymph nodes.  Chest:   Normal chest wall and respirations.  Diminished breath sounds in the right lower lobe.  No wheezing or crackles.  Heart:   S1 S2 heard.  RRR.  No murmur.   Abdomen:  Soft.  Not tender.  Not distended.  No masses, organomegaly or guarding noted.  Breasts: (R) infra-breast cluster of nevi-ulcerated mass with retraction of breast.  Nipple not seen. No other skin changes.  Negative axilla.     (L) pendulous.  No palpable masses. negative axilla.  Extremities:  No extremity edema.  Skin:   No rash noted.  CNS:   Non focal.    Lab Results:    Reviewed with patient.    Recent Results (from the past 168 hour(s))   HM1 (CBC with Diff)   Result Value Ref Range    WBC 8.8 4.0 - 11.0 thou/uL    RBC 4.44 3.80 - 5.40 mill/uL    Hemoglobin 13.4 12.0 - 16.0 g/dL    Hematocrit 41.8 35.0 - 47.0 %    MCV 94 80 - 100 fL    MCH 30.2 27.0 - 34.0 pg    MCHC 32.1 32.0 - 36.0 g/dL    RDW 13.5 11.0 - 14.5 %    Platelets 330 140 - 440 thou/uL    MPV 9.5 8.5 - 12.5 fL    Neutrophils % 69 50 - 70 %    Lymphocytes % 18 (L) 20 - 40 %    Monocytes % 8 2 - 10 %    Eosinophils % 4 0 - 6 %    Basophils % 1 0 - 2 %    Neutrophils Absolute 6.1 2.0 - 7.7 thou/uL    Lymphocytes Absolute 1.6 0.8 - 4.4 thou/uL     Monocytes Absolute 0.7 0.0 - 0.9 thou/uL    Eosinophils Absolute 0.4 0.0 - 0.4 thou/uL    Basophils Absolute 0.1 0.0 - 0.2 thou/uL   Comprehensive Metabolic Panel   Result Value Ref Range    Sodium 137 136 - 145 mmol/L    Potassium 4.9 3.5 - 5.0 mmol/L    Chloride 100 98 - 107 mmol/L    CO2 29 22 - 31 mmol/L    Anion Gap, Calculation 8 5 - 18 mmol/L    Glucose 97 70 - 125 mg/dL    BUN 15 8 - 28 mg/dL    Creatinine 0.87 0.60 - 1.10 mg/dL    GFR MDRD Af Amer >60 >60 mL/min/1.73m2    GFR MDRD Non Af Amer >60 >60 mL/min/1.73m2    Bilirubin, Total 0.4 0.0 - 1.0 mg/dL    Calcium 9.1 8.5 - 10.5 mg/dL    Protein, Total 6.7 6.0 - 8.0 g/dL    Albumin 3.1 (L) 3.5 - 5.0 g/dL    Alkaline Phosphatase 88 45 - 120 U/L    AST 21 0 - 40 U/L    ALT 28 0 - 45 U/L   Breast Carcinoma-Associated Antigen (CA 27.29)   Result Value Ref Range    CA 27.29, Serum 27 0 - 39 U/mL     Imaging:    No new imaging.

## 2021-05-28 NOTE — PROGRESS NOTES
[facility] Hematology and Oncology Progress Note    Patient: Humera Espinal  MRN: [id number]  Date of Service: 05/02/2019        Reason for Visit    Chief Complaint   Patient presents with     HE Cancer     Breast Cancer       Assessment and Plan  Cancer Staging  Malignant neoplasm of lower-inner quadrant of female breast (H)  Staging form: Breast, AJCC 8th Edition  - Clinical stage from 2/7/2019: Stage IV (cT4b, cN1, pM1, GX, ER: Positive, PA: Positive, HER2: Negative) - Signed by Evaristo Zhang MD on 2/9/2019      ECOG Performance   ECOG Performance Status: 1     Distress Assessment  Distress Assessment Score: No distress    Pain  Currently in Pain: Yes  Pain Score (Initial OR Reassessment): 1  Location: R temple    #.  Metastatic ER +/PA +/HER-2-right breast cancer (lower outer quadrant)-metastases to pleura and possible pulmonary nodules and cytology confirmed malignant pleural effusion.  Initiated palliative intent letrozole on 2/7/2019.     She is clinically stable.  Labs were reviewed and they were stable.  She feels reaccumulated pleural fluid that appears needed every 2 weeks thoracentesis.     She tolerates letrozole well without intolerable side effects.  She will continue calcium and vitamin D.   Follow-up with me in about 4 weeks with repeat labs and restaging CT scan and brain MRI prior.    #.  Sporadic discomfort in the right temporal area   Denies headache or other focal neurologic symptoms.  Will obtain MRI prior to next visit.     #.  Malignant right-sided pleural effusion   Currently on thoracentesis about every 2 weeks.  Continue to do as needed and have a standing order in place.   I discussed about Pleurx catheter placement if she is needing more frequent thoracentesis down the line.  She lives by herself and it can be challenging to have a catheter management.  At this point she is okay to coming to the clinic every 2 weeks for thoracentesis.    Problem List    1. Malignant neoplasm of  lower-inner quadrant of right breast of female, estrogen receptor positive (H)  letrozole (FEMARA) 2.5 mg tablet    Comprehensive Metabolic Panel    CT Chest Abdomen Pelvis With Oral With IV Cont    MR Brain With Without Contrast    US Thoracentesis    Comprehensive Metabolic Panel    HM2(CBC w/o Differential)   2. Pleural effusion  US Thoracentesis    Comprehensive Metabolic Panel    HM2(CBC w/o Differential)      ______________________________________________________________________________    History of Present Illness    Mrs. Espinal presents today herself.    She has lost about 3 pounds.  She denies any major side effects from letrozole.  She felt very bad yesterday from coughing and feel her lungs is filled with fluid again.  She feels appetite is poor and fill quickly.  Reported sporadic discomfort in the right temple area.  No headaches or vision changes.  No new bone pain.   She continues doing alternative treatment as well.    Pain Status  Currently in Pain: Yes    Review of Systems    Oncology Nurse Assessment/CMA Intake: Constitutional  Constitutional (WDL): Exceptions to WDL  Fatigue: Fatigue relieved by rest(worse yesterday, better today)  Fever: 38.0 - 39.0 degrees C (100.4 - 102.2 degrees F)(yesterday 101)  Chills: None  Weight Gain: None  Weight Loss: to <10% from baseline, intervention not indicated(3# 4/14/19)  Neurosensory  Neurosensory (WDL): Exceptions to WDL(slight HA-R temple)  Peripheral Motor Neuropathy: None  Ataxia: None  Peripheral Sensory Neuropathy: None  Confusion: None  Syncope: None  Eye   Eye Disorder (WDL): All eye disorder elements are within defined limits  Ear  Ear Disorder (WDL): Exceptions to WDL  Ear Pain: Mild Pain(intermittent left ear pain)  Tinnitus: None  Cardiovascular  Cardiovascular (WDL): All cardiovascular elements are within defined limits  Pulmonary  Respiratory (WDL): Exceptions to WDL  Cough: Mild symptoms, nonprescription intervention indicated(dry, worse  yesterday, better today)  Dyspnea: Shortness of breath with minimal exertion, limiting instrumental ADL(increased yesterday)  Hypoxia: None  Gastrointestinal  Gastrointestinal (WDL): Exceptions to WDL  Anorexia: Loss of appetite without alteration in eating habits(fills up easily)  Constipation: None  Diarrhea: None  Dysphagia: Symptomatic, able to eat regular diet(sore throat)  Esophagitis: None  Nausea: None  Pharyngitis: None  Vomiting: None  Dysgeusia: None  Dry Mouth: None  Genitourinary  Genitourinary (WDL): All genitourinary elements are within defined limits  Lymphatic  Lymph (WDL): All lymph disorder elements are within defined limits  Musculoskeletal and Connective Tissue  Musculoskeletal and Connetive Tissue Disorders (WDL): Exceptions to WDL  Arthralgia: None  Bone Pain: None  Muscle Weakness : Symptomatic, perceived by patient but not evident on physical exam(worse yesterday, better today)  Myalgia: None  Integumentary  Integumentary (WDL): All integumentary elements are within defined limits  Patient Coping  Patient Coping: Accepting;Open/discussion  Distress Assessment  Distress Assessment Score: No distress  Accompanied by  Accompanied by: Alone  Oral Chemo Adherence         Past History  Past Medical History:   Diagnosis Date     Hyperlipidemia      Osteoarthritis      Osteopenia      Osteoporosis      Skin cancer 2017       Physical Exam    Recent Vitals 5/2/2019   Height -   Weight 135 lbs 10 oz   BSA (m2) 1.67 m2   /60   Pulse 79   Temp 97.6   Temp src 1   SpO2 91   Some recent data might be hidden     General: alert, awake, not in acute distress  HEENT: Head: Normal, normocephalic, atraumatic.  Eye: Normal external eye, conjunctiva, lids cornea, JESUS.  Ears:  Non-tender.  Nose: Normal external nose, mucus membranes and septum.  Pharynx: Dental Hygiene adequate. Normal buccal mucosa. Normal pharynx.  Neck / Thyroid: Supple, no masses, nodes, nodules or enlargement.  Lymphatics: No  abnormally enlarged lymph nodes.  Chest: Normal chest wall and respirations.  Diminished breath sounds in the right lower lobe.  No wheezing or crackles.  Heart: S1 S2 RRR, no murmur.   Abdomen: abdomen is soft without significant tenderness, masses, organomegaly or guarding  Extremities: normal strength, tone, and muscle mass  Skin: normal. no rash or abnormalities  CNS: non focal.    Lab Results    Recent Results (from the past 168 hour(s))   HM1 (CBC with Diff)   Result Value Ref Range    WBC 8.9 4.0 - 11.0 thou/uL    RBC 4.30 3.80 - 5.40 mill/uL    Hemoglobin 13.1 12.0 - 16.0 g/dL    Hematocrit 39.7 35.0 - 47.0 %    MCV 92 80 - 100 fL    MCH 30.5 27.0 - 34.0 pg    MCHC 33.0 32.0 - 36.0 g/dL    RDW 13.7 11.0 - 14.5 %    Platelets 283 140 - 440 thou/uL    MPV 9.6 8.5 - 12.5 fL    Neutrophils % 73 (H) 50 - 70 %    Lymphocytes % 14 (L) 20 - 40 %    Monocytes % 12 (H) 2 - 10 %    Eosinophils % 0 0 - 6 %    Basophils % 1 0 - 2 %    Neutrophils Absolute 6.5 2.0 - 7.7 thou/uL    Lymphocytes Absolute 1.3 0.8 - 4.4 thou/uL    Monocytes Absolute 1.1 (H) 0.0 - 0.9 thou/uL    Eosinophils Absolute 0.0 0.0 - 0.4 thou/uL    Basophils Absolute 0.1 0.0 - 0.2 thou/uL   Comprehensive Metabolic Panel   Result Value Ref Range    Sodium 135 (L) 136 - 145 mmol/L    Potassium 4.3 3.5 - 5.0 mmol/L    Chloride 102 98 - 107 mmol/L    CO2 24 22 - 31 mmol/L    Anion Gap, Calculation 9 5 - 18 mmol/L    Glucose 129 (H) 70 - 125 mg/dL    BUN 22 8 - 28 mg/dL    Creatinine 0.99 0.60 - 1.10 mg/dL    GFR MDRD Af Amer >60 >60 mL/min/1.73m2    GFR MDRD Non Af Amer 53 (L) >60 mL/min/1.73m2    Bilirubin, Total 0.3 0.0 - 1.0 mg/dL    Calcium 9.4 8.5 - 10.5 mg/dL    Protein, Total 6.9 6.0 - 8.0 g/dL    Albumin 2.9 (L) 3.5 - 5.0 g/dL    Alkaline Phosphatase 80 45 - 120 U/L    AST 19 0 - 40 U/L    ALT 22 0 - 45 U/L       Imaging    Us Thoracentesis    Result Date: 4/19/2019  EXAM: 1. RIGHT THORACENTESIS 2. ULTRASOUND GUIDANCE LOCATION: [facility]  DATE/TIME: 4/19/2019 10:35 AM INDICATION: Pleural effusion. PROCEDURE: Informed consent obtained. Time out performed. The chest was prepped and draped in sterile fashion. 10 mL of 1 % lidocaine was infused into the local soft tissues. Under direct ultrasound guidance, a 5 [place] Yueh catheter system was placed into the pleural effusion. 0.8 liters of clear yellow fluid were removed and sent to lab, if requested. Patient tolerated procedure well. RADIOLOGIC SUPERVISION AND INTERPRETATION: ULTRASOUND GUIDANCE: Images demonstrate the pleural effusion. Catheter seen in good position.     CONCLUSION: Status post right ultrasound-guided thoracentesis. Exam was stopped due to severe coughing. Mild to moderate amount of fluid remaining. Recommend follow-up right thoracentesis at a shorter interval, 2 weeks. Reference CPT Code: 19736    TT: 40 minutes and more than 50% was spent on coordination and counseling of care.    Signed by: Evaristo Zhang MD

## 2021-05-28 NOTE — PROGRESS NOTES
Patient here today for labs and follow-up visit with Dr. Zhang for metastatic breast CA/HAYLEY Sanz RN

## 2021-05-29 NOTE — TELEPHONE ENCOUNTER
"Patient left message asking for call back to [phone].  I called patient back.  Patient reports she noticed a \"little bit\" of eye swelling below her left eye yesterday.  She noticed this morning increased swelling in her check but has gotten better this afternoon. She said she thinks she has been overdoing it by working out in the yard and helping her grandchildren plant flowers. I told her to try ice pack and to monitor her symptoms. If worsens, I recommended follow-up with PCP or Urgent Care. Patient verbalized understanding/HAYLEY Sanz RN   "

## 2021-05-29 NOTE — TELEPHONE ENCOUNTER
I called Humera today to check in and offer support.  She said she has had a very good week.  She has had energy for housework and gardening.  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  She wanted to review the results of her CT scan.  Reviewed those with her as well as Dr. Zhang's recommendation to continue on the letrozole.  Offered to sit with her at any of her appts with Dr. Zhang if that would be helpful to have another set of ears with her.  I gave her my contact information and invited calls.

## 2021-05-29 NOTE — PROGRESS NOTES
[facility] Hematology and Oncology Progress Note    Patient: Humera Espinal  MRN: [id number]  Date of Service: 05/29/2019        Reason for Visit    Chief Complaint   Patient presents with     HE Cancer     Breast Cancer       Assessment and Plan  Cancer Staging  Malignant neoplasm of lower-inner quadrant of female breast (H)  Staging form: Breast, AJCC 8th Edition  - Clinical stage from 2/7/2019: Stage IV (cT4b, cN1, pM1, GX, ER: Positive, CA: Positive, HER2: Negative) - Signed by Evaristo Zhang MD on 2/9/2019      ECOG Performance   ECOG Performance Status: 0     Distress Assessment  Distress Assessment Score: No distress    Pain  Currently in Pain: No/denies  Pain Score (Initial OR Reassessment): No/Denies Pain    #.  Metastatic ER +/CA +/HER-2-right breast cancer (lower outer quadrant)-metastases to pleura and possible pulmonary nodules and cytology confirmed malignant pleural effusion.  Initiated palliative intent letrozole on 2/7/2019.   She is clinically overall well.  Labs were reviewed and they are fairly good.  Her pleural fluid reaccumulation has become less and currently doing every 2 weeks thoracentesis.  We reviewed the interval CT scan and MRI brain.  Informed her that MRI brain was negative for intracranial metastatic cancer.  CT scan showed mixed response with a few larger pulmonary nodules, probably no liver lesion although still very small at 1 cm in size, but getting smaller lymphadenopathy, smaller right breast mass.  I informed her that this appeared to be a mixed response.  The liver lesion is likely to be too small at this point, therefore I will favor continue to monitor..  If there is signs of clear progression, we because of her overall improvement in clinical status, I favor continuing letrozole.  I discussed about repeating the scan in about 3 months. we had extensive discussion about treatments and metastatic breast cancer.  We can consider changing endocrine therapy, or adding  CDK 4/6 inhibitors if there is an evidence of clear progression.  She is very reluctant to add new medication and she does not want to risk the side effects of CDK 4/6 inhibitor.   Follow-up labs and clinical exam in 2 months.   Plan for repeat restaging scan and labs in 3 months.    #.  Sporadic discomfort in the right temporal area, resolved now   Denies headache or other focal neurologic symptoms.  MRI showed no intracranial disease.  She is reassured.      #.  Malignant right-sided pleural effusion   She is comfortable continuing thoracentesis every 2 weeks but noticing significantly less volume was removed.  She is advised to increase in the interval to every 3 weeks or 4 weeks as she is clinically well.  She will try to adjust with how she is feeling.  Continue with thoracentesis every 2-4 weeks as needed.      Problem List    1. Malignant neoplasm of lower-inner quadrant of right breast of female, estrogen receptor positive (H)  Comprehensive Metabolic Panel   2. Aromatase inhibitor use  Comprehensive Metabolic Panel      ______________________________________________________________________________    History of Present Illness    Mrs. Espinal presents today herself.      She reported sweating intermittently.  She denies any intolerable side effects from letrozole.  She noted sensation like checking pain in the right side of the head and now resolved with taking apple cider vinegar and honey.  She had thoracentesis today.  Denies shortness of breath or chest pain.  Reported her pleural fluid has gradually diminish and currently doing every other week thoracentesis.    Pain Status  Currently in Pain: No/denies    Review of Systems    Oncology Nurse Assessment/CMA Intake: Constitutional  Constitutional (WDL): Exceptions to WDL  Fatigue: None  Fever: None  Chills: None  Neurosensory  Neurosensory (WDL): All neurosensory elements are within defined limits  Eye   Eye Disorder (WDL): All eye disorder elements are  within defined limits  Ear  Ear Disorder (WDL): Exceptions to WDL  Ear Pain: Mild Pain(intermittent left ear pain)  Tinnitus: None  Cardiovascular  Cardiovascular (WDL): All cardiovascular elements are within defined limits  Pulmonary  Respiratory (WDL): Exceptions to WDL  Cough: Mild symptoms, nonprescription intervention indicated(occ, dry cough)  Dyspnea: Shortness of breath with moderate exertion  Hypoxia: None  Gastrointestinal  Gastrointestinal (WDL): All gastrointestinal elements are within defined limits  Genitourinary  Genitourinary (WDL): All genitourinary elements are within defined limits  Lymphatic  Lymph (WDL): All lymph disorder elements are within defined limits  Musculoskeletal and Connective Tissue  Musculoskeletal and Connetive Tissue Disorders (WDL): Exceptions to WDL  Arthralgia: None  Bone Pain: None  Muscle Weakness : Symptomatic, perceived by patient but not evident on physical exam(in the mornings)  Myalgia: None  Integumentary  Integumentary (WDL): All integumentary elements are within defined limits  Patient Coping  Patient Coping: Accepting;Open/discussion  Distress Assessment  Distress Assessment Score: No distress  Accompanied by  Accompanied by: Alone  Oral Chemo Adherence         Past History  Past Medical History:   Diagnosis Date     Hyperlipidemia      Osteoarthritis      Osteopenia      Osteoporosis      Skin cancer 2017       Physical Exam    Recent Vitals 5/29/2019   Height -   Weight 134 lbs 10 oz   BSA (m2) 1.66 m2   /83   Pulse 95   Temp 97.9   Temp src 1   SpO2 92   Some recent data might be hidden     General: alert, awake, not in acute distress  HEENT: Head: Normal, normocephalic, atraumatic.  Eye: Normal external eye, conjunctiva, lids cornea, JESUS.  Ears:  Non-tender.  Nose: Normal external nose, mucus membranes and septum.  Pharynx: Dental Hygiene adequate. Normal buccal mucosa. Normal pharynx.  Neck / Thyroid: Supple, no masses, nodes, nodules or  enlargement.  Lymphatics: No abnormally enlarged lymph nodes.  Chest: Normal chest wall and respirations.  Diminished breath sounds in the right lower lobe.  No wheezing or crackles.  Heart: S1 S2 RRR, no murmur.   Abdomen: abdomen is soft without significant tenderness, masses, organomegaly or guarding  Extremities: normal strength, tone, and muscle mass  Skin: normal. no rash or abnormalities  CNS: non focal.    Lab Results    Recent Results (from the past 168 hour(s))   Comprehensive Metabolic Panel   Result Value Ref Range    Sodium 138 136 - 145 mmol/L    Potassium 4.3 3.5 - 5.0 mmol/L    Chloride 101 98 - 107 mmol/L    CO2 27 22 - 31 mmol/L    Anion Gap, Calculation 10 5 - 18 mmol/L    Glucose 91 70 - 125 mg/dL    BUN 25 8 - 28 mg/dL    Creatinine 1.00 0.60 - 1.10 mg/dL    GFR MDRD Af Amer >60 >60 mL/min/1.73m2    GFR MDRD Non Af Amer 53 (L) >60 mL/min/1.73m2    Bilirubin, Total 0.2 0.0 - 1.0 mg/dL    Calcium 9.6 8.5 - 10.5 mg/dL    Protein, Total 7.3 6.0 - 8.0 g/dL    Albumin 3.0 (L) 3.5 - 5.0 g/dL    Alkaline Phosphatase 72 45 - 120 U/L    AST 16 0 - 40 U/L    ALT 18 0 - 45 U/L   HM1 (CBC with Diff)   Result Value Ref Range    WBC 9.6 4.0 - 11.0 thou/uL    RBC 4.24 3.80 - 5.40 mill/uL    Hemoglobin 12.9 12.0 - 16.0 g/dL    Hematocrit 39.8 35.0 - 47.0 %    MCV 94 80 - 100 fL    MCH 30.4 27.0 - 34.0 pg    MCHC 32.4 32.0 - 36.0 g/dL    RDW 13.4 11.0 - 14.5 %    Platelets 338 140 - 440 thou/uL    MPV 9.6 8.5 - 12.5 fL    Neutrophils % 70 50 - 70 %    Lymphocytes % 20 20 - 40 %    Monocytes % 7 2 - 10 %    Eosinophils % 2 0 - 6 %    Basophils % 1 0 - 2 %    Neutrophils Absolute 6.7 2.0 - 7.7 thou/uL    Lymphocytes Absolute 2.0 0.8 - 4.4 thou/uL    Monocytes Absolute 0.7 0.0 - 0.9 thou/uL    Eosinophils Absolute 0.2 0.0 - 0.4 thou/uL    Basophils Absolute 0.1 0.0 - 0.2 thou/uL       Imaging    Ct Chest Abdomen Pelvis With Oral With Iv Cont    Result Date: 5/24/2019  EXAM: CT CHEST ABDOMEN PELVIS W ORAL W IV  CONTRAST LOCATION: [facility] DATE/TIME: 5/24/2019 3:05 PM INDICATION: metastatic breast cancer, treatment follow up COMPARISON: CT 1/31/2019 TECHNIQUE: Helical thin-section CT scan of the chest, abdomen, and pelvis was performed before and after injection of IV contrast. Multiplanar reformats were obtained. Dose reduction techniques were used. CONTRAST: Iohexol (Omni) 75mL FINDINGS: CHEST: Small to moderate right-sided pleural effusion with adjacent consolidation likely reflecting atelectasis. Tiny amount of air within the pleural space. Diffuse right-sided nodular pleural thickening is similar to the prior exam. No dominant measurable pleural-based mass. Numerous small predominantly left-sided solid pulmonary nodules. A dominant nodule in the left lower lobe medial basilar segment measures 7 mm image 121 series 3 versus 4 mm previously. Another dominant left lower lobe nodule image 1 6 measures 5 mm versus 3 mm previously. Additional left lung nodules demonstrate similar interval increase in size. The previously seen spiculated lower right breast mass measures 1.0 x 3.1 cm image 60 series 2 versus 1.3 x 3.4 cm previously. A dominant irregular lower right axillary node measures up to 0.9 cm versus 1.0 cm previously. No thoracic or hilar adenopathy by imaging size criteria. Prominent pericardial and right epiphrenic nodes with a dominant right epiphrenic node measuring 6 mm short axis dimension versus 5 mm previously. Borderline cardiomegaly. Small pericardial effusion.  ABDOMEN: Subtle 1.0 cm nodular enhancing lesion in the upper right hepatic lobe image 89 series 2. Unremarkable gallbladder, bile ducts, spleen, pancreas and adrenals. Stable bilateral perinephric stranding is likely chronic. 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 Stable left peripelvic renal cysts as well as stable asymmetric prominence of the left ureter. No obstructing stone or mass. Duplex right renal  collecting system. Unremarkable stomach. Normal caliber small bowel. Normal appendix. Tortuous normal caliber colon. No free fluid, free air or adenopathy. PELVIS: Post hysterectomy. Unremarkable urinary bladder. No free fluid or adenopathy. MUSCULOSKELETAL: Negative.     CONCLUSION: 1.  Slight interval decrease in size of the right breast mass and abnormal lower right axillary lymph node. 2.  Numerous small predominantly left-sided solid pulmonary nodules which are mildly increased in size since the prior exam. 3.  Essentially stable widespread nodular right-sided pleural thickening consistent with metastatic disease. 4.  Small to moderate right-sided pleural effusion. Minimal air within the right pleural space. 5.  Small indeterminate nodular enhancing lesion within the upper right hepatic lobe. Further evaluation with MR may be helpful. At minimum, recommend short interval CT follow-up in 3 months. 6.  Multiple stable chronic findings as described above.    Mr Brain With Without Contrast    Result Date: 5/27/2019  EXAM: MR BRAIN W WO CONTRAST LOCATION: [facility] DATE/TIME: 5/24/2019 2:36 PM INDICATION: Metastatic breast cancer, right sided headache. COMPARISON: None. CONTRAST: Gadavist 6 ml. TECHNIQUE: Routine multiplanar multisequence head MRI without and with intravenous contrast. FINDINGS: INTRACRANIAL CONTENTS: No acute or subacute infarct. No mass, acute hemorrhage, or extra-axial fluid collections. Patchy nonspecific T2/FLAIR hyperintensities within the cerebral white matter most consistent with mild to moderate chronic microvascular ischemic change. Mild generalized cerebral atrophy. No hydrocephalus. Normal position of the cerebellar tonsils. No pathologic enhancement. SELLA: No significant abnormality accounting for technique. BONES/SOFT TISSUES: No aggressive osseous lesion involving the calvarium, skull base, or visualized upper cervical spine. The major intracranial vascular flow voids are  maintained. ORBITS: No significant abnormality accounting for technique. SINUSES/MASTOIDS: No significant paranasal sinus mucosal disease. No significant middle ear or mastoid effusion.     CONCLUSION: 1.  No MRI evidence of metastatic disease to the brain, meninges, calvarium or skull base. 2.  Mild to moderate presumed chronic small vessel ischemic changes. 3.  Mild generalized cerebral volume loss.     Us Thoracentesis    Result Date: 5/29/2019  EXAM: 1. RIGHT THORACENTESIS 2. ULTRASOUND GUIDANCE LOCATION: [facility] DATE/TIME: 5/29/2019 2:36 PM INDICATION: Pleural effusion. PROCEDURE: Informed consent obtained. Time out performed. The chest was prepped and draped in sterile fashion. 10 mL of 1 % lidocaine was infused into the local soft tissues. Under direct ultrasound guidance, a 5 Norwegian Yueh catheter system was placed into the pleural effusion. 600 cc of clear yellow fluid were removed and sent to lab, if requested. Patient tolerated procedure well. RADIOLOGIC SUPERVISION AND INTERPRETATION: ULTRASOUND GUIDANCE: Images demonstrate the pleural effusion. Catheter seen in good position.     CONCLUSION: Status post right ultrasound-guided thoracentesis. Reference CPT Code: 65273    Us Thoracentesis    Result Date: 5/15/2019  EXAM: 1. RIGHT THORACENTESIS 2. ULTRASOUND GUIDANCE LOCATION: [facility] DATE/TIME: 5/15/2019 2:14 PM INDICATION: Pleural effusion. PROCEDURE: Informed consent obtained. Time out performed. The chest was prepped and draped in sterile fashion. 10 mL of 1 % lidocaine was infused into the local soft tissues. Under direct ultrasound guidance, a 5 Norwegian Yueh catheter system was placed into the pleural effusion. 0.75 liters of clear yellow fluid were removed and sent to lab, if requested. Patient tolerated procedure well. RADIOLOGIC SUPERVISION AND INTERPRETATION: ULTRASOUND GUIDANCE: Images demonstrate the pleural effusion. Catheter seen in good position.     CONCLUSION: Status  post right ultrasound-guided thoracentesis. Reference CPT Code: 49155    Us Thoracentesis    Result Date: 5/2/2019  EXAM: 1. RIGHT THORACENTESIS 2. ULTRASOUND GUIDANCE LOCATION: [facility] DATE/TIME: 5/2/2019 2:22 PM INDICATION: Pleural effusion. PROCEDURE: Informed consent obtained. Time out performed. The chest was prepped and draped in sterile fashion. 10 mL of 1 % lidocaine was infused into the local soft tissues. 5 Swiss Yueh catheter system was placed into the pleural effusion. 0.85 liters of clear yellow fluid were removed and sent to lab, if requested. Patient tolerated procedure well. RADIOLOGIC SUPERVISION AND INTERPRETATION: Right pleural effusion seen. ULTRASOUND GUIDANCE: Images demonstrate the pleural effusion. Catheter seen in good position.     CONCLUSION: Status post right ultrasound-guided thoracentesis. Reference CPT Code: 33527     TT: 40 minutes: time consisted of medical record review, examination of patient, completing documentation and counseling time on the labs results, CT/MRI results and follow-up plan.    Signed by: Evaristo Zhang MD

## 2021-05-29 NOTE — TELEPHONE ENCOUNTER
Transmission to pharmacy failed during last prescription attempt, resent electronically to pharmacy.    Marquita Omer, RN  [facility] Medication Refill Nurse  6/1/2019  11:03 AM

## 2021-05-29 NOTE — TELEPHONE ENCOUNTER
RN cannot approve Refill Request    RN can NOT refill this medication med is not covered by policy/route to provider     . Last office visit: 1/29/2019 Don Goodson MD Last Physical: Visit date not found Last MTM visit: Visit date not found Last visit same specialty: 1/29/2019 Don Goodson MD.  Next visit within 3 mo: Visit date not found  Next physical within 3 mo: Visit date not found      Linda Teague, [facility] Triage/Med Refill 5/28/2019    Requested Prescriptions   Pending Prescriptions Disp Refills     calcipotriene (DOVONEX) 0.005 % topical solution [Pharmacy Med Name: CALCIPOTRIENE 0.005% SOLUTION] 60 mL 0     Sig: APPLY TO AFFECTED AREA TWICE A DAY       There is no refill protocol information for this order

## 2021-05-29 NOTE — TELEPHONE ENCOUNTER
RN cannot approve Refill Request    RN can NOT refill this medication med is not covered by policy/route to provider. Last office visit: 1/29/2019 Don Goodson MD Last Physical: Visit date not found Last MTM visit: Visit date not found Last visit same specialty: 1/29/2019 Don Goodson MD.  Next visit within 3 mo: Visit date not found  Next physical within 3 mo: Visit date not found      Fallon Mckenzie, [facility] Triage/Med Refill 5/29/2019    Requested Prescriptions   Pending Prescriptions Disp Refills     calcipotriene (DOVONEX) 0.005 % topical solution [Pharmacy Med Name: CALCIPOTRIENE 0.005% SOLUTION] 60 mL 0     Sig: APPLY TO AFFECTED AREA TWICE A DAY       There is no refill protocol information for this order

## 2021-05-30 ENCOUNTER — RECORDS - HEALTHEAST (OUTPATIENT)
Dept: ADMINISTRATIVE | Facility: CLINIC | Age: 86
End: 2021-05-30

## 2021-05-30 NOTE — PROGRESS NOTES
At Humera's request, I sat with her and took notes for her during her visit with Dr. Zhang this afternoon.  Humera said she continues to tire easily, but does enjoy life including gardening, her sons and her 4 grandchildren.  Her family continues to be very supportive.  She has been able to call and have her ultrasound guided thoracentesis appointments scheduled quite easily and this is working for her. She continues to be very independent in her own home with her family's support.  Denies further needs right now.  Support and encouragement and reassurance provided, invited calls.

## 2021-05-30 NOTE — PROGRESS NOTES
[facility] Hematology and Oncology Progress Note    Patient: Humera Espinal  MRN: [id number]  Date of Service: 07/24/2019        Reason for Visit    Chief Complaint   Patient presents with     HE Cancer     Breast Cancer       Assessment and Plan  Cancer Staging  Malignant neoplasm of lower-inner quadrant of female breast (H)  Staging form: Breast, AJCC 8th Edition  - Clinical stage from 2/7/2019: Stage IV (cT4b, cN1, pM1, GX, ER: Positive, OH: Positive, HER2: Negative) - Signed by Evaristo Zhang MD on 2/9/2019      ECOG Performance   ECOG Performance Status: 1     Distress Assessment  Distress Assessment Score: No distress    Pain  Currently in Pain: No/denies    #.  Metastatic ER +/OH +/HER-2-right breast cancer (lower outer quadrant)-metastases to pleura and possible pulmonary nodules and cytology confirmed malignant pleural effusion.  Initiated palliative intent letrozole on 2/7/2019.   She is clinically stable.  She does not feel that she is getting worse but noted ongoing dyspnea with exertion.  She had last thoracentesis over a week ago on 7/15/2019.  She was thinking about doing another thoracentesis early next week.  Labs were reviewed and they are remarkably good except for mildly low albumin which could be the indicator of chronic illness or malnutrition.  She has gradual weight loss.  I shared my concern about it and advised her keeping in a healthy weight and try to eat more nutritious food and supplements.   Follow-up labs/provider visit and restaging CT scan prior in end of August.    #.  Sporadic discomfort in the right temporal area, resolved now   Denies headache or other focal neurologic symptoms.  MRI showed no intracranial disease.  She thought that it was resolved after taking vinegar and honey.     #.  Malignant right-sided pleural effusion   She is comfortable continuing thoracentesis every 2- 21/2 weeks but noticing significantly less volume was removed.  Continue as current.  She  does not feel ready to have Pleurx catheter.    Problem List    1. Malignant neoplasm of lower-inner quadrant of right breast of female, estrogen receptor positive (H)  CT Chest Abdomen Pelvis With Oral With IV Cont      ______________________________________________________________________________    History of Present Illness    Mrs. Espinal present herself today.      She reported to bright red blood in stool 2 times today and history of hemorrhoids.  No abdominal pain, nausea, vomiting.  Denies any lightheadedness or dizziness.  She reported that she had a right upper arm pain lasted for a couple days.  It was associated with weakness in her right hand.  She thought it was related to stroke.  By the time when she went to see Dr. Jim her pain, swelling and numbness resolved.  No recurrence of that.  She reported ongoing dry cough and shortness of breath with activity.  She has been doing thoracentesis about every 2-3 weeks depending on how she is feeling heavy in her chest.    She reports that she is taking the medication regularly.  She also take alternative medicine as well.  She thinks she is doing overall well.  She has already contacted [facility] and arrange for help that she may need at end of life.    Pain Status  Currently in Pain: No/denies    Review of Systems    Oncology Nurse Assessment/CMA Intake: Constitutional  Constitutional (WDL): Exceptions to WDL  Fatigue: Fatigue relieved by rest  Fever: None  Chills: None  Weight Gain: None  Neurosensory  Neurosensory (WDL): All neurosensory elements are within defined limits  Eye   Eye Disorder (WDL): All eye disorder elements are within defined limits  Ear  Ear Disorder (WDL): All ear disorder elements are within defined limits  Cardiovascular  Cardiovascular (WDL): All cardiovascular elements are within defined limits  Pulmonary  Respiratory (WDL): Exceptions to WDL  Cough: Mild symptoms, nonprescription intervention indicated(dry)  Dyspnea:  Shortness of breath with minimal exertion, limiting instrumental ADL  Hypoxia: None  Gastrointestinal  Gastrointestinal (WDL): Exceptions to WDL(bright red blood today after BMs X 2 today. Hx hemorrhoids)  Anorexia: None  Constipation: None(occ)  Diarrhea: None  Dysphagia: None  Esophagitis: None  Nausea: Loss of appetite without alteration in eating habits(occ)  Pharyngitis: None  Vomiting: None  Dysgeusia: None  Dry Mouth: None  Genitourinary  Genitourinary (WDL): All genitourinary elements are within defined limits  Lymphatic  Lymph (WDL): All lymph disorder elements are within defined limits  Musculoskeletal and Connective Tissue  Musculoskeletal and Connetive Tissue Disorders (WDL): All Musculoskeletal and Connetive Tissue Disorder elements are within defined limits  Integumentary  Integumentary (WDL): All integumentary elements are within defined limits  Patient Coping  Patient Coping: Accepting;Open/discussion  Distress Assessment  Distress Assessment Score: No distress  Accompanied by  Accompanied by: Alone  Oral Chemo Adherence         Past History  Past Medical History:   Diagnosis Date     Hyperlipidemia      Osteoarthritis      Osteopenia      Osteoporosis      Skin cancer 2017       Physical Exam    Recent Vitals 7/24/2019   Height -   Weight 130 lbs 6 oz   BSA (m2) 1.63 m2   /63   Pulse 105   Temp 98.3   Temp src 1   SpO2 92   Some recent data might be hidden     General: alert, awake, not in acute distress  HEENT: Head: Normal, normocephalic, atraumatic.  Eye: Normal external eye, conjunctiva, lids cornea, JESUS.  Ears:  Non-tender.  Nose: Normal external nose, mucus membranes and septum.  Pharynx: Dental Hygiene adequate. Normal buccal mucosa. Normal pharynx.  Neck / Thyroid: Supple, no masses, nodes, nodules or enlargement.  Lymphatics: No abnormally enlarged lymph nodes.  Chest: Normal chest wall and respirations.  Diminished breath sounds in the right lower lobe.  No wheezing or  crackles.  Heart: S1 S2 RRR, no murmur.   Abdomen: abdomen is soft without significant tenderness, masses, organomegaly or guarding  Extremities: normal strength, tone, and muscle mass  Skin: normal. no rash or abnormalities  CNS: non focal.    Lab Results    Recent Results (from the past 168 hour(s))   Comprehensive Metabolic Panel   Result Value Ref Range    Sodium 138 136 - 145 mmol/L    Potassium 4.5 3.5 - 5.0 mmol/L    Chloride 103 98 - 107 mmol/L    CO2 23 22 - 31 mmol/L    Anion Gap, Calculation 12 5 - 18 mmol/L    Glucose 108 70 - 125 mg/dL    BUN 26 8 - 28 mg/dL    Creatinine 0.86 0.60 - 1.10 mg/dL    GFR MDRD Af Amer >60 >60 mL/min/1.73m2    GFR MDRD Non Af Amer >60 >60 mL/min/1.73m2    Bilirubin, Total 0.2 0.0 - 1.0 mg/dL    Calcium 9.7 8.5 - 10.5 mg/dL    Protein, Total 7.3 6.0 - 8.0 g/dL    Albumin 2.9 (L) 3.5 - 5.0 g/dL    Alkaline Phosphatase 77 45 - 120 U/L    AST 17 0 - 40 U/L    ALT 18 0 - 45 U/L   HM1 (CBC with Diff)   Result Value Ref Range    WBC 9.6 4.0 - 11.0 thou/uL    RBC 4.31 3.80 - 5.40 mill/uL    Hemoglobin 13.0 12.0 - 16.0 g/dL    Hematocrit 39.8 35.0 - 47.0 %    MCV 92 80 - 100 fL    MCH 30.2 27.0 - 34.0 pg    MCHC 32.7 32.0 - 36.0 g/dL    RDW 13.4 11.0 - 14.5 %    Platelets 355 140 - 440 thou/uL    MPV 9.6 8.5 - 12.5 fL    Neutrophils % 70 50 - 70 %    Lymphocytes % 20 20 - 40 %    Monocytes % 8 2 - 10 %    Eosinophils % 2 0 - 6 %    Basophils % 1 0 - 2 %    Neutrophils Absolute 6.7 2.0 - 7.7 thou/uL    Lymphocytes Absolute 1.9 0.8 - 4.4 thou/uL    Monocytes Absolute 0.7 0.0 - 0.9 thou/uL    Eosinophils Absolute 0.2 0.0 - 0.4 thou/uL    Basophils Absolute 0.1 0.0 - 0.2 thou/uL       Imaging    Us Thoracentesis    Result Date: 7/15/2019  EXAM: 1. RIGHT THORACENTESIS 2. ULTRASOUND GUIDANCE LOCATION: [facility] DATE/TIME: 7/15/2019 4:19 PM INDICATION: Pleural effusion. PROCEDURE: Informed consent obtained. Time out performed. The chest was prepped and draped in sterile fashion. 10  mL of 1 % lidocaine was infused into the local soft tissues. Under direct ultrasound guidance, a 5 Syriac Yueh catheter system was placed into the pleural effusion. 0.75 liters of yellow fluid were removed and sent to lab, if requested. Patient tolerated procedure. RADIOLOGIC SUPERVISION AND INTERPRETATION: Pleural effusion. ULTRASOUND GUIDANCE: Images demonstrate the pleural effusion. Catheter seen in good position.     CONCLUSION: Status post right ultrasound-guided thoracentesis. Reference CPT Code: 74995     Us Thoracentesis    Result Date: 6/28/2019  EXAM: 1. RIGHT THORACENTESIS 2. ULTRASOUND GUIDANCE LOCATION: [facility] DATE/TIME: 6/28/2019 2:48 PM INDICATION: Pleural effusion. PROCEDURE: Informed consent obtained. Time out performed. The chest was prepped and draped in sterile fashion. 10 mL of 1 % lidocaine was infused into the local soft tissues. Under direct ultrasound guidance, a 5 Syriac Yueh catheter system was placed into the pleural effusion. 675 cc of pleural fluid were removed. Patient tolerated procedure well. RADIOLOGIC SUPERVISION AND INTERPRETATION: ULTRASOUND GUIDANCE: Images demonstrate the pleural effusion. Catheter seen in good position.     CONCLUSION: Status post right ultrasound-guided thoracentesis. Reference CPT Code: 53816    TT: 40 minutes: time consisted of medical record review, examination of patient, completing documentation and counseling time and management of recurrent right thoracentesis, time for restaging.    Signed by: Evaristo Zhang MD

## 2021-05-30 NOTE — PROGRESS NOTES
"Office Visit - Follow up    Humera Espinal   85 y.o. female    Date of Visit: 7/18/2019    Chief Complaint   Patient presents with     Shoulder Pain     RT shoulder pain. Pt says she couldnt move arm yesterday. Notice pain in her right hands this morning.        Subjective: Pleasant patient of Dr. Don Goodson with right-sided breast cancer being treated by Dr. braga.  Current regimen reviewed    Has evidence of right-sided axillary metastases and pleural effusion    Diagnosis about 6 months ago.    She complains of mild discomfort in the right upper arm this began about midshaft of the right humerus and extended up to the shoulder.  This discomfort was unrelated to trauma or injury is very she knew and noted no color change etc.  Over the course of the last 24 hours this has subsided however she has noted mild edema of the right upper extremity    She had some concerns about a potential stroke.  She has had no sensory or motor changes.        ROS: A comprehensive review of systems was performed and was otherwise negative except as mentioned above.     Exam  Right arm exam reveals minimal edema I am unable to palpate axillary nodes.  There is slight venous engorgement.  Range of motion of the shoulder is well preserved    Decreased breath sounds right lung base   /74 (Patient Site: Right Arm, Patient Position: Sitting, Cuff Size: Adult Regular)   Pulse 100   Resp 18   Ht 5' 4\" (1.626 m)   Wt 130 lb (59 kg)   LMP 01/01/1962   SpO2 93%   BMI 22.31 kg/m      Assessment and Plan  I did reassure her there is no evidence of a CVA.  Suspect mild lymphedema related to venous and lymphatic occlusion right axillary region.  Does not require treatment for now however discussed use of Ace bandage I discussed with patient which should help.  No other changes follow-up as needed with Dr. group Meza and Dr. omi Grubbs was seen today for shoulder pain.    Diagnoses and all orders for this visit:    Pleural " effusion    Malignant neoplasm of retroperitoneum (H)     Malignant neoplasm of lower-inner quadrant of right female breast, unspecified estrogen receptor status (H)    Lymphedema of arm          Time: total time spent with the patient was 25 minutes of which >50% was spent in counseling and coordination of care        No Known Allergies    Medications :  Prior to Admission medications    Medication Sig Start Date End Date Taking? Authorizing Provider   biotin 2,500 mcg cap Take 1 capsule by mouth daily.   Yes PROVIDER, HISTORICAL   calcipotriene (DOVONEX) 0.005 % topical solution APPLY TO AFFECTED AREA TWICE A DAY 6/1/19  Yes Don Goodson MD   CHOLECALCIFEROL, VITAMIN D3, ORAL Take 600 Units by mouth daily.   Yes PROVIDER, HISTORICAL   docoshexanoic acid-eicosapent 500 mg (FISH OIL) 500-100 mg cap capsule Take 1,000 mg by mouth daily.   Yes PROVIDER, HISTORICAL   letrozole (FEMARA) 2.5 mg tablet Take 1 tablet (2.5 mg total) by mouth daily. 5/2/19  Yes Evaristo Zhang MD   timolol maleate (TIMOPTIC) 0.5 % ophthalmic solution Administer 1 drop to both eyes daily.   Yes PROVIDER, HISTORICAL   UNABLE TO FIND Take 1 Dose by mouth 2 (two) times a day. Combination pack of vitamins   Yes PROVIDER, HISTORICAL   UNABLE TO FIND Mushroom and fruit pectin   Yes PROVIDER, HISTORICAL        Past Medical History:   Past Medical History:   Diagnosis Date     Hyperlipidemia      Osteoarthritis      Osteopenia      Osteoporosis      Skin cancer 2017       Past Surgical History:   Past Surgical History:   Procedure Laterality Date     HYSTERECTOMY  1962    age 28     KNEE ARTHROSCOPY Right      US THORACENTESIS  1/31/2019     US THORACENTESIS  2/14/2019     US THORACENTESIS  3/15/2019     US THORACENTESIS  4/19/2019     US THORACENTESIS  5/2/2019     US THORACENTESIS  5/15/2019     US THORACENTESIS  5/29/2019     US THORACENTESIS  6/28/2019     US THORACENTESIS  7/15/2019       Social History:   Social History      Socioeconomic History     Marital status:      Spouse name: Not on file     Number of children: Not on file     Years of education: Not on file     Highest education level: Not on file   Occupational History     Not on file   Social Needs     Financial resource strain: Not on file     Food insecurity:     Worry: Not on file     Inability: Not on file     Transportation needs:     Medical: Not on file     Non-medical: Not on file   Tobacco Use     Smoking status: Never Smoker     Smokeless tobacco: Never Used   Substance and Sexual Activity     Alcohol use: No     Frequency: Never     Drug use: No     Sexual activity: Never   Lifestyle     Physical activity:     Days per week: Not on file     Minutes per session: Not on file     Stress: Not on file   Relationships     Social connections:     Talks on phone: Not on file     Gets together: Not on file     Attends Religion service: Not on file     Active member of club or organization: Not on file     Attends meetings of clubs or organizations: Not on file     Relationship status: Not on file     Intimate partner violence:     Fear of current or ex partner: Not on file     Emotionally abused: Not on file     Physically abused: Not on file     Forced sexual activity: Not on file   Other Topics Concern     Not on file   Social History Narrative    , 2 sons       Family History:   Family History   Problem Relation Age of Onset     Lung cancer Sister      Hyperlipidemia Sister          Jose Jim MD

## 2021-05-31 ENCOUNTER — RECORDS - HEALTHEAST (OUTPATIENT)
Dept: ADMINISTRATIVE | Facility: CLINIC | Age: 86
End: 2021-05-31

## 2021-05-31 VITALS — HEIGHT: 63 IN | BODY MASS INDEX: 25.52 KG/M2 | WEIGHT: 144 LBS

## 2021-05-31 NOTE — TELEPHONE ENCOUNTER
PA initiated for Ibrance.     Medication Prior Authorization    Start Date:  8/30/19  Type:  New  Urgency:  Urgent  Med Type:  Specialty  Insurance Info:  Medicare Blue - Phone [phone] Fax [phone]  Method:  ePA  Reference #:  Key: [id number] - PA Case ID: [id number]  ___________________________________________________________________________________________________________________:

## 2021-05-31 NOTE — PROGRESS NOTES
[facility] Hematology and Oncology Progress Note    Patient: Humera Espinal  MRN: [id number]  Date of Service: 08/29/2019        Reason for Visit    Chief Complaint   Patient presents with     HE Cancer     Breast Cancer       Assessment and Plan  Cancer Staging  Malignant neoplasm of lower-inner quadrant of female breast (H)  Staging form: Breast, AJCC 8th Edition  - Clinical stage from 2/7/2019: Stage IV (cT4b, cN1, pM1, GX, ER: Positive, AZ: Positive, HER2: Negative) - Signed by Evaristo Zhang MD on 2/9/2019      ECOG Performance   ECOG Performance Status: 1     Distress Assessment  Distress Assessment Score: No distress    Pain  Currently in Pain: No/denies    #.  Metastatic ER +/AZ +/HER-2-right breast cancer (lower outer quadrant)-metastases to pleura and possible pulmonary nodules and cytology confirmed malignant pleural effusion.  Initiated palliative intent letrozole on 2/7/2019.  Progression of disease noted in August 2019, therapy changed to Faslodex and palbociclib.     Reviewed the interval CT scan and it showed a progression of pleural thickening.  Clinically, she has required every 2-week thoracentesis with minimal symptom relief.  There is no left-sided pleural effusion with undetermined etiology.  Pulmonary nodules, and a 1 cm liver lesion appear to be stable.  Putting together, it is consistent with progression of disease.  Discussed about changing treatment to Faslodex/palbociclib or changing endocrine therapy to Faslodex alone.  After discussion about risks and benefits, patient agreed on Faslodex/palbociclib.     She will continue letrozole until she received Faslodex which will be schedule next week once we have authorization.  Faslodex will be given D1, day 14, day 28, then every 28 days after.  Ordered palbociclib and patient can start anytime she received that.  Once after she starts palbociclib, she will need weekly labs for the first cycle of treatment.     Also discussed about  symptom focused care under hospice.  She would like to continue cancer directed therapy at this point.   Follow-up provider visit in about 5 weeks.    #.  Exertional dyspnea.  Likely secondary to her pulmonary disease and moderate right hydropneumothorax/trapped lung   She has oxygen saturation of 95% room air at rest.  Her oxygen saturation dropped to about 90% with exertion.  She does not qualify for supplemental oxygen at this point.  We will continue to assess.     #.  Malignant right-sided pleural effusion   She is comfortable continuing thoracentesis every 2 weeks for now.  She does not feel ready to have Pleurx catheter.    #.  Ongoing weight loss.   It is concerning.  Continue with supportive nutritious food and small frequent meals.    #.  Encouraged her to get pneumococcal vaccine at any time.  She should receive influenza vaccination once it is available.    Problem List    1. Malignant neoplasm of lower-inner quadrant of right breast of female, estrogen receptor positive (H)  palbociclib (IBRANCE) 125 mg cap capsule      ______________________________________________________________________________    History of Present Illness    Mrs. Espinal present herself today.      Reported progressive fatigue and hot flashes at night.  Increasing dry cough.  She is incontinent of stool from coughing.  She has not been doing hot bath.  She continued to do a few holistic medication.  She has ongoing weight loss.    Pain Status  Currently in Pain: No/denies    Review of Systems    Oncology Nurse Assessment/CMA Intake: Constitutional  Constitutional (WDL): Exceptions to WDL  Fatigue: Fatigue not relieved by rest - Limiting instrumental ADL  Fever: None  Chills: None  Weight Gain: None  Weight Loss: None  Neurosensory  Neurosensory (WDL): Exceptions to WDL  Peripheral Motor Neuropathy: None  Ataxia: Asymptomatic, clinical or diagnostic observations only, intervention not indicated  Peripheral Sensory Neuropathy:  None  Confusion: None  Syncope: None  Eye   Eye Disorder (WDL): All eye disorder elements are within defined limits  Ear  Ear Disorder (WDL): All ear disorder elements are within defined limits  Cardiovascular  Cardiovascular (WDL): All cardiovascular elements are within defined limits  Pulmonary  Respiratory (WDL): Exceptions to WDL  Cough: Moderate symptoms, medical intervention indicated, limiting instrumental ADL(increased, dry)  Dyspnea: Shortness of breath with minimal exertion, limiting instrumental ADL  Hypoxia: None  Gastrointestinal  Gastrointestinal (WDL): Exceptions to WDL(incontience-occ from coughing/frequent, formed stools)  Anorexia: Loss of appetite without alteration in eating habits  Constipation: None  Diarrhea: None  Dysphagia: Symptomatic, able to eat regular diet(occ, when drinking/swallows carefully)  Esophagitis: None  Nausea: None  Pharyngitis: None  Vomiting: None  Dysgeusia: None  Dry Mouth: None  Genitourinary  Genitourinary (WDL): All genitourinary elements are within defined limits  Lymphatic  Lymph (WDL): All lymph disorder elements are within defined limits  Musculoskeletal and Connective Tissue  Musculoskeletal and Connetive Tissue Disorders (WDL): All Musculoskeletal and Connetive Tissue Disorder elements are within defined limits  Integumentary  Integumentary (WDL): All integumentary elements are within defined limits  Patient Coping  Patient Coping: Accepting;Open/discussion  Distress Assessment  Distress Assessment Score: No distress  Accompanied by  Accompanied by: Alone  Oral Chemo Adherence         Past History  Past Medical History:   Diagnosis Date     Hyperlipidemia      Osteoarthritis      Osteopenia      Osteoporosis      Skin cancer 2017       Physical Exam    Recent Vitals 8/29/2019   Height -   Weight 129 lbs 8 oz   BSA (m2) 1.63 m2   /57   Pulse 91   Temp 98.3   Temp src 1   SpO2 95   Some recent data might be hidden     General: alert, awake, not in acute  distress  HEENT: Head: Normal, normocephalic, atraumatic.  Eye: Normal external eye, conjunctiva, lids cornea, JESUS.  Ears:  Non-tender.  Nose: Normal external nose, mucus membranes and septum.  Pharynx: Dental Hygiene adequate. Normal buccal mucosa. Normal pharynx.  Neck / Thyroid: Supple, no masses, nodes, nodules or enlargement.  Lymphatics: No abnormally enlarged lymph nodes.  Chest: Normal chest wall and respirations.  Diminished breath sounds in the right lower lobe.  No wheezing or crackles.  Heart: S1 S2 RRR, no murmur.   Abdomen: abdomen is soft without significant tenderness, masses, organomegaly or guarding  Extremities: normal strength, tone, and muscle mass  Skin: normal. no rash or abnormalities  CNS: non focal.    Lab Results    Recent Results (from the past 168 hour(s))   Protime-INR   Result Value Ref Range    INR 1.02 0.90 - 1.10   Platelet Count   Result Value Ref Range    Platelets 370 140 - 440 thou/uL   POCT CREATININE   Result Value Ref Range    iSTAT Creatinine 1.1 0.6 - 1.1 mg/dL    iSTAT GFR MDRD Af Amer 57 (L) >60 mL/min/1.73m2    iSTAT GFR MDRD Non Af Amer 47 (L) >60 mL/min/1.73m2   Comprehensive Metabolic Panel   Result Value Ref Range    Sodium 137 136 - 145 mmol/L    Potassium 4.3 3.5 - 5.0 mmol/L    Chloride 100 98 - 107 mmol/L    CO2 29 22 - 31 mmol/L    Anion Gap, Calculation 8 5 - 18 mmol/L    Glucose 130 (H) 70 - 125 mg/dL    BUN 27 8 - 28 mg/dL    Creatinine 0.98 0.60 - 1.10 mg/dL    GFR MDRD Af Amer >60 >60 mL/min/1.73m2    GFR MDRD Non Af Amer 54 (L) >60 mL/min/1.73m2    Bilirubin, Total 0.2 0.0 - 1.0 mg/dL    Calcium 9.6 8.5 - 10.5 mg/dL    Protein, Total 7.0 6.0 - 8.0 g/dL    Albumin 2.6 (L) 3.5 - 5.0 g/dL    Alkaline Phosphatase 72 45 - 120 U/L    AST 15 0 - 40 U/L    ALT 18 0 - 45 U/L   HM1 (CBC with Diff)   Result Value Ref Range    WBC 8.8 4.0 - 11.0 thou/uL    RBC 4.55 3.80 - 5.40 mill/uL    Hemoglobin 13.6 12.0 - 16.0 g/dL    Hematocrit 42.2 35.0 - 47.0 %    MCV 93 80 -  100 fL    MCH 29.9 27.0 - 34.0 pg    MCHC 32.2 32.0 - 36.0 g/dL    RDW 13.6 11.0 - 14.5 %    Platelets 400 140 - 440 thou/uL    MPV 9.4 8.5 - 12.5 fL    Neutrophils % 79 (H) 50 - 70 %    Lymphocytes % 12 (L) 20 - 40 %    Monocytes % 7 2 - 10 %    Eosinophils % 1 0 - 6 %    Basophils % 1 0 - 2 %    Neutrophils Absolute 6.9 2.0 - 7.7 thou/uL    Lymphocytes Absolute 1.1 0.8 - 4.4 thou/uL    Monocytes Absolute 0.6 0.0 - 0.9 thou/uL    Eosinophils Absolute 0.1 0.0 - 0.4 thou/uL    Basophils Absolute 0.1 0.0 - 0.2 thou/uL       Imaging    Ct Chest Abdomen Pelvis With Oral With Iv Cont    Result Date: 8/27/2019  EXAM: CT CHEST ABDOMEN PELVIS W ORAL W IV CONTRAST LOCATION: [facility] DATE/TIME: 8/27/2019 3:19 PM INDICATION: Breast cancer, mets suspected, staging COMPARISON: 5/24/2019. TECHNIQUE: Helical thin-section CT scan of the chest, abdomen, and pelvis was performed before and after injection of IV contrast. Multiplanar reformats were obtained. Dose reduction techniques were used. CONTRAST: Iohexol (Omni) 75mL FINDINGS: CHEST: There is moderate right hydropneumothorax/trapped lung there is diffuse thickening of the visceral and parietal pleura in the right lung consistent with pleural metastases. Pleural thickening has progressed mildly from the prior study. The pleural  metastases likely prevent complete reexpansion of the lung consistent with probable trapped lung rather than hydropneumothorax no shift of the midline. There is a small left pleural effusion and there are multiple left pulmonary nodules in the inferior right upper lobe peripherally and in the left lower lobe findings are suspicious for pulmonary metastases. There is a small pericardial effusion. No mediastinal or hilar lymphadenopathy. No coronary artery calcification. No axillary lymphadenopathy.  ABDOMEN: /Enhancing lesion in the right lobe of liver on image 94 of series 2 measuring approximately 1 cm in diameter is not definitely changed  from the prior study. No other focal hepatic lesions. Normal CT appearance of the spleen, pancreas, adrenal glands and gallbladder. Mild perinephric stranding is unchanged mild left pyelocaliectasis is unchanged no renal or ureteral stones or masses. No abdominal aortic aneurysm. No abdominal lymphadenopathy. PELVIS: No abnormal pelvic masses or lymphadenopathy. Uterus is not visualized consistent with hysterectomy. No bladder stone or mass. MUSCULOSKELETAL: Hypertrophic facet changes bilaterally at L4-5 and L5-S1. No fractures or definite bony metastases.     CONCLUSION: 1.  Extensive right-sided pleural metastases with probable trapped lung and mixture of air and fluid in the right pleural space. The pleural thickening has mildly progressed from the prior study. 2.  Multiple small left pulmonary nodules suspicious for pulmonary metastasis are stable however there is a new small left pleural effusion. 3.  1 cm enhancing lesion right lobe of the liver is unchanged no other focal hepatic lesions no lymphadenopathy in the abdomen or pelvis 4.  Mild left pyelocaliectasis is unchanged    Us Thoracentesis    Result Date: 8/27/2019  EXAM: 1. RIGHT  THORACENTESIS 2. ULTRASOUND GUIDANCE LOCATION: [facility] DATE/TIME: 8/27/2019 4:00 PM INDICATION: Pleural effusion. PROCEDURE: Informed consent obtained. Time out performed. The chest was prepped and draped in sterile fashion. 5  mL of 1 % lidocaine was infused into the local soft tissues. Under direct ultrasound guidance, a 5 [employer] Portable Zoo catheter system was placed into the pleural effusion. 0.6  liters of clear yellow fluid were removed and sent to lab, if requested. Patient tolerated procedure well. RADIOLOGIC SUPERVISION AND INTERPRETATION: ULTRASOUND GUIDANCE: Images demonstrate the pleural effusion. Catheter seen in good position.     CONCLUSION: Status post right  ultrasound-guided thoracentesis. Reference CPT Code: 43483    Us Thoracentesis    Result Date:  8/14/2019  EXAM: 1. RIGHT THORACENTESIS 2. ULTRASOUND GUIDANCE LOCATION: [facility] DATE/TIME: 8/14/2019 2:00 PM INDICATION: Pleural effusion. PROCEDURE: Informed consent obtained. Time out performed. The chest was prepped and draped in sterile fashion. 10 mL of 1 % lidocaine was infused into the local soft tissues. Under direct ultrasound guidance, a 5 Arabic Yueh catheter system was placed into the pleural effusion. 0.65 liters of suraj fluid were removed and sent to lab, if requested. Patient tolerated procedure well. The patient began having minor pain and coughing and the procedure was terminated with a moderate-sized effusion remaining. RADIOLOGIC SUPERVISION AND INTERPRETATION: ULTRASOUND GUIDANCE: Images demonstrate the pleural effusion. Catheter seen in good position.     CONCLUSION: Status post right ultrasound-guided thoracentesis. Reference CPT Code: 44737    TT: 40 minutes: time consisted of medical record review, examination of patient, completing documentation and counseling time on the restaging scan, change in treatment plan and follow-up.    Signed by: Evaristo Zhang MD

## 2021-05-31 NOTE — PATIENT INSTRUCTIONS - HE
Palbociclib Oral capsule  What is this medicine?  PALBOCICLIB (pal chelsie SYMORIAH klib) is a chemotherapy drug. It targets a specific protein within cancer cells and stops the cancer cells from growing. This medicine is used to treat breast cancer.  This medicine may be used for other purposes; ask your health care provider or pharmacist if you have questions.  What should I tell my health care provider before I take this medicine?  They need to know if you have any of these conditions:    infection (especially a virus infection such as chickenpox, cold sores, or herpes)    low blood counts, like low white cell, platelet, or red cell counts    an unusual or allergic reaction to palbociclib, other medicines, foods, dyes, or preservatives    pregnant or trying to get pregnant    breast-feeding  How should I use this medicine?  Take this medicine by mouth with a glass of water. Follow the directions on the prescription label. Take this medicine with food. Avoid grapefruit and grapefruit juice while you are taking this medicine. Swallow the capsule whole. Do not cut, crush or chew this medicine. Take your medicine at regular intervals. Do not take it more often than directed. Do not stop taking except on your doctor's advice.  Talk to your pediatrician regarding the use of this medicine in children. Special care may be needed.  Overdosage: If you think you've taken too much of this medicine contact a poison control center or emergency room at once.  NOTE: This medicine is only for you. Do not share this medicine with others.  What if I miss a dose?  If you miss a dose or vomit after taking a dose, do not take another dose on that day. Take your next dose at your regular time.  What may interact with this medicine?  This medicine may interact with the following medications:    alfentanil    antiviral medicines for HIV or AIDS    boceprevir    bosentan    carbamazepine    certain medicines for fungal infections like  ketoconazole, itraconazole, posaconazole, voriconazole    clarithromycin    cyclosporine    ergot alkaloids like dihydroergotamine, ergotamine    everolimus    fentanyl    grapefruit juice    midazolam    modafinil    nafcillin    nefazodone    phenobarbital    phenytoin    pimozide    quinidine    rifampin    sirolimus    [place] Wort    tacrolimus    telaprevir    telithromycin    verapamil  This list may not describe all possible interactions. Give your health care provider a list of all the medicines, herbs, non-prescription drugs, or dietary supplements you use. Also tell them if you smoke, drink alcohol, or use illegal drugs. Some items may interact with your medicine.  What should I watch for while using this medicine?  Visit your doctor for regular check ups. Report any side effects. Continue your course of treatment unless your doctor tells you to stop. You will need blood work done while you are taking this medicine.  Do not become pregnant while taking this medicine or for 2 weeks after stopping it. Women should inform their doctor if they wish to become pregnant or think they might be pregnant. There is a potential for serious side effects to an unborn child. Talk to your health care professional or pharmacist for more information. Do not breast-feed an infant while taking this medicine.  Avoid taking products that contain aspirin, acetaminophen, ibuprofen, naproxen, or ketoprofen unless instructed by your doctor. These medicines may hide a fever.  Be careful brushing and flossing your teeth or using a toothpick because you may get an infection or bleed more easily. If you have any dental work done, tell your dentist you are receiving this medicine.  Call your doctor or health care professional for advice if you get a fever, chills or sore throat, or other symptoms of a cold or flu. Do not treat yourself. This drug decreases your body's ability to fight infections. Try to avoid being around people who  are sick.  This medicine may increase your risk to bruise or bleed. Call your doctor or health care professional if you notice any unusual bleeding.  This drug may make you feel generally unwell. This is not uncommon, as chemotherapy can affect healthy cells as well as cancer cells. Report any side effects. Continue your course of treatment even though you feel ill unless your doctor tells you to stop.  This medicine may cause low sperm counts in some men. However, this medicine is not usually used in men. Talk to your health care professional or pharmacist for more information.  What side effects may I notice from receiving this medicine?  Side effects that you should report to your doctor or health care professional as soon as possible:    allergic reactions like skin rash, itching or hives, swelling of the face, lips, or tongue    dizziness    mouth sores    low blood counts - this medicine may decrease the number of white blood cells, red blood cells and platelets. You may be at increased risk for infections and bleeding.    pain, tingling, numbness in the hands or feet    severe or persistent diarrhea, nausea, vomiting    signs and symptoms of a blood clot such as breathing problems; changes in vision; chest pain; severe, sudden headache; pain, swelling, warmth in the leg; trouble speaking; sudden numbness or weakness of the face, arm or leg    signs and symptoms of infection like fever or chills; cough; sore throat; pain or trouble passing urine    signs of decreased platelets or bleeding - nosebleed, bruising, pinpoint red spots on the skin, black, tarry stools, blood in the urine    signs of decreased red blood cells - unusually weak or tired, feeling faint or lightheaded, falls  Side effects that usually do not require medical attention (Report these to your doctor or health care professional if they continue or are bothersome.):    decreased appetite    hair thinning or hair loss    mild  diarrhea    nausea    weak or tired  This list may not describe all possible side effects. Call your doctor for medical advice about side effects. You may report side effects to FDA at 1-928-QHZ-3455.  Where should I keep my medicine?  Keep out of the reach of children.  Store between 20 and 25 degrees C (68 and 77 degrees F). Throw away any unused medicine after the expiration date.  NOTE: This sheet is a summary. It may not cover all possible information. If you have questions about this medicine, talk to your doctor, pharmacist, or health care provider.  NOTE:This sheet is a summary. It may not cover all possible information. If you have questions about this medicine, talk to your doctor, pharmacist, or health care provider. Copyright  2015 Gold Standard         Fulvestrant Solution for injection  What is this medicine?  FULVESTRANT (ful VES trant) blocks the effects of estrogen. It is used to treat breast cancer in women past the age of menopause.  This medicine may be used for other purposes; ask your health care provider or pharmacist if you have questions.  What should I tell my health care provider before I take this medicine?  They need to know if you have any of these conditions:    bleeding problems    liver disease    low levels of platelets in the blood    an unusual or allergic reaction to fulvestrant, other medicines, foods, dyes, or preservatives    pregnant or trying to get pregnant    breast-feeding  How should I use this medicine?  This medicine is for injection into a muscle. It is usually given by a health care professional in a hospital or clinic setting.  Talk to your pediatrician regarding the use of this medicine in children. Special care may be needed.  Overdosage: If you think you have taken too much of this medicine contact a poison control center or emergency room at once.  NOTE: This medicine is only for you. Do not share this medicine with others.  What if I miss a dose?  It is  important not to miss your dose. Call your doctor or health care professional if you are unable to keep an appointment.  What may interact with this medicine?    medicines that treat or prevent blood clots like warfarin, enoxaparin, and dalteparin  This list may not describe all possible interactions. Give your health care provider a list of all the medicines, herbs, non-prescription drugs, or dietary supplements you use. Also tell them if you smoke, drink alcohol, or use illegal drugs. Some items may interact with your medicine.  What should I watch for while using this medicine?  Your condition will be monitored carefully while you are receiving this medicine. You will need important blood work done while you are taking this medicine.  Do not become pregnant while taking this medicine. Women should inform their doctor if they wish to become pregnant or think they might be pregnant. There is a potential for serious side effects to an unborn child. Talk to your health care professional or pharmacist for more information.  What side effects may I notice from receiving this medicine?  Side effects that you should report to your doctor or health care professional as soon as possible:    allergic reactions like skin rash, itching or hives, swelling of the face, lips, or tongue    feeling faint or lightheaded, falls    fever or flu-like symptoms    sore throat    vaginal bleeding  Side effects that usually do not require medical attention (report to your doctor or health care professional if they continue or are bothersome):    aches, pains    constipation or diarrhea    headache    hot flashes    nausea, vomiting    pain at site where injected    stomach pain  This list may not describe all possible side effects. Call your doctor for medical advice about side effects. You may report side effects to FDA at 6-119-FDA-7205.  Where should I keep my medicine?  This drug is given in a hospital or clinic and will not be stored  at home.  NOTE:This sheet is a summary. It may not cover all possible information. If you have questions about this medicine, talk to your doctor, pharmacist, or health care provider. Copyright  2015 Gold Standard

## 2021-05-31 NOTE — TELEPHONE ENCOUNTER
PA was approved for Ibrance.     Medication Prior Authorization    Start Date:  8/30/19  Status:  Approved  Approval Date:  8/30/19  Authorization Effective Date:  6/1/19  Authorization Expiration Date:  8/29/22  Type:  New  Urgency:  Urgent  Med Type:  Specialty  Insurance Info:  Medicare Blue - Phone [phone] Fax [phone]  Method:  ePA  Reference #:  Key: [id number] - PA Case ID: [id number]  Pharmacy Notified:  Yes  Patient Notified:  Yes  ___________________________________________________________________________________________________________________:

## 2021-05-31 NOTE — PROGRESS NOTES
I met with Humera at  medical oncology and took notes at her appointment with Dr. Zhang at patient's request. Notes summarizing the appointment were given to patient and we reviewed them together.  There is evidence of progression of Stage IV breast cancer on CT and she will now start Ibrance and Faslodex.  I did inform Humera a new navigator will be on board by the middle of September, and I will be available to her as well to assist her. She does appreciate the navigation present at oncology appointments and asks for this assistance when possible. Cyntiha Haque RN

## 2021-06-01 VITALS — BODY MASS INDEX: 25.16 KG/M2 | WEIGHT: 142 LBS | HEIGHT: 63 IN

## 2021-06-01 VITALS — WEIGHT: 143 LBS | BODY MASS INDEX: 25.13 KG/M2

## 2021-06-01 NOTE — PATIENT INSTRUCTIONS - HE
Today's instructions:    I ordered morphine for your cough and shortness of breath.  Take a small amount to start, 2.5 mg.  Take your first dose when your family is present.    I sent an order for hospice to [facility].  They will be out in the next couple of days.    Monitor for constipation with the opioids.  Prune juice is a good way to start.    It's been a pleasure meeting you.

## 2021-06-01 NOTE — TELEPHONE ENCOUNTER
Refill requested from [facility] for Ibrance. Last refilled on 8/29/19.  Quantity #21. No refills. Message sent to Dr. Zhang/HAYLEY Sanz RN

## 2021-06-01 NOTE — TELEPHONE ENCOUNTER
"Patient calling says she started IBRANCE three weeks ago.  Yesterday she had one episode of diarrhea shortly after taking the medication at 1:00pm with her main meal.  The same thing happened today.  Has been getting constipated lately too.  She had hard stool and had to \"push\" followed by watery stool.  Says she did have some blood in the stool both days but says this is not new.  Says she gets blood in her stool off an on.    Patient requesting message to provider.  Patient is asking if the diarrhea and worsening constipation could be related to the medication.  Is asking if she should continue taking the medication?      Jenn Solorio, RN  Triage Nurse Advisor        "

## 2021-06-01 NOTE — PROGRESS NOTES
Pulmonary Clinic Outpatient Consultation    Assessment and Plan:   85 year old lady with history of metastatic breast cancer, HLD, presents for evaluation of shortness of breath and pleural effusion. We reviewed her CT scan today. The effusion is very large and takes up a majority of the right hemithorax. It is associated with trapped lung and hydropneumothorax. I discussed that repeated thoracentesis and even tunneled pleural catheter are unlikely to lead to re-expansion of the lung since it looks trapped. However a tunneled pleural catheter could be a good option and allow her to drain the effusion at her discretion. Discussed that her disease is incurable and she would likely continue to have progressive dyspnea and there is no single treamtent that will prevent or reverse this. She is realistic about her prognosis and wants to have a good quality of life at this point. Discussed that thoracotomy and decortication is not a good option for her given her age and frailty and she agreed.    Recommendations:  - she agrees to proceed with Pleurex catheter placement, order placed for IR tunneled pleural catheter   - recommend palliative care/hospice referral when she's ready  - encouraged her to continue to eat, try to gain weight, exercise and remain active as able  - flu vaccine today. Recommend pneumonia vaccine series as well, she will discuss with her PMD.     She will see Dr. Zhang at the end of this month and she can follow up with me as needed if she has specific questions about the pleural disease or the pleurex catheter, otherwise I would defer ongoing management of her metastatic disease to oncology.  All questions answered.      CCx: hydropneumothorax, metastatic breast cancer    HPI: 85 year old lady with history of metastatic breast cancer, HLD, presents for evaluation of shortness of breath and pleural effusion. She is referred by Dr. Zhang at  cancer [facility]. She is currently getting palliative chemo for  breast cancer which was noted to be progressive as of her last scans. She has significant pleural disease on the right with hydropneumothorax and trapped lung on right. She gets shortness of breath with minimal activity. She coughs frequently, non-productive especially in the shower when she is exposed to moisture. She's lost 15lbs since Jan when she was diagnosed. No chest pain, chest pressure, palpitations, peripheral edema.  She gets thora's every 2 weeks.  The effusion was noted to be malignant in Jan 2019.  She is a never smoker and was actually quite healthy prior to her diagnosis of breast cancer. no prior history of lung diseases.   She knows that her disease is terminal but says she has an important project to accomplish through her [employer] and wants to live long enough to see it through. She was given 6-12 months to live from her diagnosis back in January.  She is in touch with hospice/palliative care team but is not on hospice currently.     ROS:  A 12-system review was obtained and was negative with the exception of the symptoms endorsed in the history of present illness.    PMH:  Past Medical History:   Diagnosis Date     Hyperlipidemia      Osteoarthritis      Osteopenia      Osteoporosis      Skin cancer 2017       PSH:  Past Surgical History:   Procedure Laterality Date     HYSTERECTOMY  1962    age 28     KNEE ARTHROSCOPY Right      US THORACENTESIS  1/31/2019     US THORACENTESIS  2/14/2019     US THORACENTESIS  3/15/2019     US THORACENTESIS  4/19/2019     US THORACENTESIS  5/2/2019     US THORACENTESIS  5/15/2019     US THORACENTESIS  5/29/2019     US THORACENTESIS  6/28/2019     US THORACENTESIS  7/15/2019     US THORACENTESIS  7/29/2019     US THORACENTESIS  8/14/2019     US THORACENTESIS  8/27/2019     US THORACENTESIS  9/10/2019       Allergies:  No Known Allergies    Family HX:  Family History   Problem Relation Age of Onset     Lung cancer Sister      Hyperlipidemia Sister    Family  history reviewed and is non-contributory.     Social Hx:  Social History     Socioeconomic History     Marital status:      Spouse name: Not on file     Number of children: Not on file     Years of education: Not on file     Highest education level: Not on file   Occupational History     Not on file   Social Needs     Financial resource strain: Not on file     Food insecurity:     Worry: Not on file     Inability: Not on file     Transportation needs:     Medical: Not on file     Non-medical: Not on file   Tobacco Use     Smoking status: Never Smoker     Smokeless tobacco: Never Used   Substance and Sexual Activity     Alcohol use: No     Frequency: Never     Drug use: No     Sexual activity: Never   Lifestyle     Physical activity:     Days per week: Not on file     Minutes per session: Not on file     Stress: Not on file   Relationships     Social connections:     Talks on phone: Not on file     Gets together: Not on file     Attends Mandaeism service: Not on file     Active member of club or organization: Not on file     Attends meetings of clubs or organizations: Not on file     Relationship status: Not on file     Intimate partner violence:     Fear of current or ex partner: Not on file     Emotionally abused: Not on file     Physically abused: Not on file     Forced sexual activity: Not on file   Other Topics Concern     Not on file   Social History Narrative    , 2 sons       Current Meds:  Current Outpatient Medications   Medication Sig Dispense Refill     biotin 2,500 mcg cap Take 1 capsule by mouth daily.       calcipotriene (DOVONEX) 0.005 % topical solution APPLY TO AFFECTED AREA TWICE A DAY 60 mL 0     CHOLECALCIFEROL, VITAMIN D3, ORAL Take 600 Units by mouth daily.       docoshexanoic acid-eicosapent 500 mg (FISH OIL) 500-100 mg cap capsule Take 1,000 mg by mouth daily.       palbociclib (IBRANCE) 125 mg cap capsule Take 1 capsule (125 mg total) by mouth daily. Take one capsule daily for  "21 days and then do not take for 7 days. Repeat. 21 capsule 0     timolol maleate (TIMOPTIC) 0.5 % ophthalmic solution Administer 1 drop to both eyes daily.       UNABLE TO FIND Take 1 Dose by mouth 2 (two) times a day. Combination pack of vitamins       No current facility-administered medications for this visit.        Physical Exam:  Ht 5' 4\" (1.626 m)   Wt 124 lb 11.2 oz (56.6 kg)   LMP 01/01/1962   BMI 21.40 kg/m    Gen: awake, alert, oriented, no distress  HEENT: nasal turbinates are unremarkable, no oropharyngeal lesions, no cervical or supraclavicular lymphadenopathy  CV: RRR, no M/G/R  Resp: absent BS right base with dullness to percussion. Clear on left. No wheezing.   Abd: soft, nontender, no palpable organomegaly  Skin: no apparent rashes  Ext: no cyanosis, clubbing or edema  Neuro: alert, nonfocal    Labs:  Reviewed    Pleural fluid cytology + for met breast cancer    Glucose 42  11% lymph 6% \"other cell\"    Imaging studies:  CT chest abd/pelvis  IMPRESSION:   CONCLUSION:  1.  Extensive right-sided pleural metastases with probable trapped lung and mixture of air and fluid in the right pleural space. The pleural thickening has mildly progressed from the prior study.  2.  Multiple small left pulmonary nodules suspicious for pulmonary metastasis are stable however there is a new small left pleural effusion.  3.  1 cm enhancing lesion right lobe of the liver is unchanged no other focal hepatic lesions no lymphadenopathy in the abdomen or pelvis  4.  Mild left pyelocaliectasis is unchanged    PFT's   None available    Carlton (Genaro León MD  [facility] Pulmonary & Critical Care  Pager [phone]  Clinic [phone]    "

## 2021-06-01 NOTE — TELEPHONE ENCOUNTER
Called patient with lab results from yesterday.  [facility] and was happy to see the results.  She will be in on 9/25 for another lab draw/HAYLEY Sanz RN

## 2021-06-01 NOTE — BRIEF OP NOTE
Interventional Radiology Post-Procedure Note   [facility]    Patient name: Humera Espinal  Pt MRN:[id number]   Date of procedure: 9/27/2019     Procedure: Right Aspira pleural guided drain placement    Sedation method: Moderate Sedation    Pre Procedure Diagnosis: Malignant Pleural Effusion  Post Procedure Diagnosis: Same    Contrast: None    Medications:  Midazolam 0.5 mg IV  Fentanyl 25 mcg IV    Sedation time: 15 min    Estimated Blood Loss: Minimal    Complications: None    Findings:  1. Moderate to large right hydropneumothorax.   2. Successful placement of a right Aspira tunneled pleural drainage catheter.  3. 450 mL of serosanguinous fluid drained.  Sputtering noted, likely related to pleural air component.    Plan:  1. Will do drainage teaching in recovery.  2. No more than 1000 mL drainage per session.  Every other day recommended to start.  3. Assess clinical response.  If right lung is trapped, my not improve symptoms even with drainage.  ?   Please see dictation in PACS or under the Imaging tab in [facility] for detailed procedure note.     Hola Ellis M.D.   Vascular and Interventional Radiology   Pager: [phone]  After Hours / Scheduling: [phone]     9/27/2019  9:31 AM

## 2021-06-01 NOTE — PROGRESS NOTES
Pt presented for labs only today and clinic will call her as needed per loreta reynaga. Genevieve Garcia RN

## 2021-06-01 NOTE — PROGRESS NOTES
"Outpatient Palliative Care Consult      Humera Espinal,  1934, MRN [id number]        PCP: Don Goodson MD, [phone]   Code status:  DNR/I       Extended Emergency Contact Information  Primary Emergency Contact: Aram Espinal   [facility]  Home Phone: [phone]  Mobile Phone: [phone]  Relation: Child  Secondary Emergency Contact: Dale Espinal   [facility]  Home Phone: [phone]  Mobile Phone: [phone]  Relation: Child          Impression and Recommendations:  1. Pain and shortness of breath related to metastatic breast cancer and malignant pleural effusions and presence of PleurX catheter  Comment:  Mostly \"discomfort\" from the catheter; most bothered by shortness of breath with any activity and cough that is worse with talking; strong indication for opioids given metastatic breast cancer and patient has chosen \"comfort\" focus instead of chemotherapy; would like oxygen as it has helped her breathing but has not been able to qualify for it  Plan: will start low dose morphine for cough, shortness of breath and discomfort; to take first dose with family present; will be starting hospice care with [facility] and they will come in tomorrow or Friday; they also could order the morphine but patient and son would like to start it tonight  Referral faxed to [facility]  Will be able to get oxygen and all supplies needed through hospice    2. Palliative Care Encounter - Please see discussion below.  Comment:  Patient has Advance Directive; does not want any heroics such as ventilator or feeding tube; does not want CPR; chooses DNR/I/\"natural death\"; chooses to have quality days at her home for as long as possible; planning for [facility] in home hospice and if no longer able to manage at home will move to [facility] residence; very comfortable with her decision; strong support from her children and grandchildren who live close to her " "and provide support every day for whatever she needs including nutritional support  Plan:  Referral faxed to [facility]    Grief and Loss:  Comment: Initial Palliative Care visit today. Patient demonstrates understanding of where she is at with her disease process. She no longer wants to continue treatments, wants to live her life as best as she can. She does not want to live being sick the rest of her days. Patient wants to die a natural death and is interested in hospice care. Writer discussed hospice philosophy, including team approach, equipment, medications and medicare benefit. Patient has contacted [facility] already. Writer informed of other services they offer including home hospice. Offered choices including [facility]. Patient wishes to work with [facility] as she has already contacted them. Patient accepting and coping well. Son David present and support whatever decision she makes. Patient finds her strength in her julita and family support. Denies any fears or worries.      Chief Complaint Shortness of breath and cough       HPI    Humera Espinal is a 85 y.o. year old female presents to the Outpatient Palliative Care Clinic today for ongoing symptom management and goals of care discussion.  JEMAL Baker was present as part of the interdisciplinary team.  Patient is accompanied today by her son David.    Referred by Dr. León, pulmonologist.    Summary:  Humera is an 85 year old woman with Stage IV breast cancer diagnosed 2/7/2019.  She was initiated on letrozole in February for palliative intent.  Progression of disease was noted in August 2019 and therapy was changed to Faslodex and palbociclib.  After 1 cycle she decided to stop all cancer treatment and chose symptom focused care.  Humera was evaluated by Dr León mid September.  \"We reviewed her CT scan today. The effusion is very large and takes up a majority of the right hemithorax. It is " "associated with trapped lung and hydropneumothorax. I discussed that repeated thoracentesis and even tunneled pleural catheter are unlikely to lead to re-expansion of the lung since it looks trapped. However a tunneled pleural catheter could be a good option and allow her to drain the effusion at her discretion. Discussed that her disease is incurable and she would likely continue to have progressive dyspnea and there is no single treamtent that will prevent or reverse this. She is realistic about her prognosis and wants to have a good quality of life at this point. Discussed that thoracotomy and decortication is not a good option for her given her age and frailty and she agreed.\"    Humera is with her son David today.  She is totally at peace with her decision to focus on comfort and have no further treatment for her cancer.  She would like relief from her shortness of breath and is open to low dose morphine.  She will be starting hospice through [facility] and they will be able to provide oxygen which will also give her support.    Previous hospitalizations:  No recent hospitalizations    Palliative Care Assessment:  Psychosocial/Emotional/Spiritual:  Living situation:  Lives independently in small home; both sons and their families live close by  Baseline functional status/Current Palliative Performance Score:  (0=death; 100=fully functional):    50%- 1. Mainly sit/lie; 2. Unable to do any work, extensive disease; 3. Considerable assistance required; 4. Normal or reduced; 5. Full or confusion   PPS prior to decline/recent changes in condition:  Marital status/children: ; 2 adult children and many grandchildren  Support systems: family and julita  Financial concerns: not discussed  Employment:   homemaker    Serious Illness Conversation:  Patient's current understanding of illness: understands that her cancer is not curable and chooses no further treatment  Patient's/family's hopes/wishes/goals: to " "stay at home as long as possible and be as comfortable as possible  Patient's/family's fears/worries about future with your health:  No worries  What gives you strength/how do you cope: family and her [employer] julita  How much does your family know about your wishes:  Knowledgeable and accepting    Advance Care Planning:  Code Status: DNR/I  Health Care Directive: filed; no heroics; chooses \"natural death\"  Documented Healthcare Agent or surrogate; son Aram  Decision making capacity:  Patient demonstrates decision-making capacity; she does demonstrate understanding of relevant information about condition, the available test and treatment options, use reason to make a decision about these, and communicate choice about these. (DOMINIC 306, 4, p. 420-4).    Patient does meet criteria for Hospice Medicare benefit. Diagnosis metastatic breast cancer; choosing comfort focus.     Medical History  Active Ambulatory (Non-Hospital) Problems    Diagnosis     Metastatic breast cancer (H)     Malignant pleural effusion     Trapped lung     Hydropneumothorax     Aromatase inhibitor use     Encounter for medication management     Pleural effusion     Malignant neoplasm of lower-inner quadrant of female breast (H)     Past Medical History:   Diagnosis Date     Hydropneumothorax      Hyperlipidemia      Malignant pleural effusion      Metastatic breast cancer (H)      Osteoarthritis      Osteopenia      Osteoporosis      Skin cancer 2017     Trapped lung     Surgical History  She  has a past surgical history that includes Knee arthroscopy (Right); Hysterectomy (1962); US Thoracentesis (1/31/2019); US Thoracentesis (2/14/2019); US Thoracentesis (3/15/2019); US Thoracentesis (4/19/2019); US Thoracentesis (5/2/2019); US Thoracentesis (5/15/2019); US Thoracentesis (5/29/2019); US Thoracentesis (6/28/2019); US Thoracentesis (7/15/2019); US Thoracentesis (7/29/2019); US Thoracentesis (8/14/2019); US Thoracentesis (8/27/2019); US " "Thoracentesis (9/10/2019); US Thoracentesis (9/25/2019); and IR Tunneled Pleural Catheter Insertion (9/27/2019).   Social History  Reviewed, and she  reports that she has never smoked. She has never used smokeless tobacco. She reports that she does not drink alcohol or use drugs.   Allergies  No Known Allergies Family History  Reviewed, and family history includes Hyperlipidemia in her sister; Lung cancer in her sister.   Psychosocial Needs  Social History     Patient does not qualify to have social determinant information on file (likely too young).   Social History Narrative    , 2 sons     Additional psychosocial needs reviewed per nursing assessment.     Medications & Allergies   Medications:     Current Outpatient Medications:      biotin 2,500 mcg cap, Take 1 capsule by mouth daily., Disp: , Rfl:      calcipotriene (DOVONEX) 0.005 % topical solution, APPLY TO AFFECTED AREA TWICE A DAY (Patient taking differently: Apply 1 application topically daily. APPLY TO AFFECTED AREA TWICE A DAY   ), Disp: 60 mL, Rfl: 0     CHOLECALCIFEROL, VITAMIN D3, ORAL, Take 600 Units by mouth daily., Disp: , Rfl:      docoshexanoic acid-eicosapent 500 mg (FISH OIL) 500-100 mg cap capsule, Take 1,000 mg by mouth daily., Disp: , Rfl:      timolol maleate (TIMOPTIC) 0.5 % ophthalmic solution, Administer 1 drop to both eyes daily., Disp: , Rfl:      UNABLE TO FIND, Take 1 Dose by mouth 2 (two) times a day. Combination pack of vitamins, Disp: , Rfl:   No current facility-administered medications for this visit.     Allergies/intolerances:    No Known Allergies       Review of Systems:  Review of Systems  Pain: \"discomfort\" from pleurX tube  Dyspnea: 9/10  Fatigue: 9/10  Nausea: 0/10  Anorexia: 1/10  Drowsiness: 0/10  Constipation: no  Agitation: 0  Anxiety: 0/10  Depression: 0/10    Dementia: 0   Delirium: 0  Coma: 0    Physical Exam:  /76   Pulse (!) 110   Resp 24   Ht 5' 4\" (1.626 m)   Wt 126 lb 8 oz (57.4 kg)   LMP " 01/01/1962   SpO2 95% Comment: ABDIRIZAK  BMI 21.71 kg/m    General appearance: alert, appears stated age, cachectic, cooperative and mild distress  Head: Normocephalic, without obvious abnormality, atraumatic  Eyes: conjunctivae/corneas clear. PERRL, EOM's intact. Fundi benign.  Nose: Nares normal. Septum midline. Mucosa normal. No drainage or sinus tenderness.  Lungs: frequent non productive cough; decreased breath sounds right  Heart: tachycardia at rest  Abdomen: soft, non-tender; bowel sounds normal; no masses,  no organomegaly  Extremities: extremities normal, atraumatic, no cyanosis or edema  Skin: Skin color, texture, turgor normal. No rashes or lesions  Neurologic: Grossly normal       Pertinent Labs  Lab Results: personally reviewed.   Lab Results   Component Value Date     10/01/2019     09/25/2019     08/29/2019    K 4.1 10/01/2019    K 4.3 09/25/2019    K 4.3 08/29/2019    CO2 28 10/01/2019    CO2 28 09/25/2019    CO2 29 08/29/2019    BUN 18 10/01/2019    BUN 24 09/25/2019    BUN 27 08/29/2019    CREATININE 0.98 10/01/2019    CREATININE 1.18 (H) 09/25/2019    CREATININE 0.98 08/29/2019    CALCIUM 9.2 10/01/2019    CALCIUM 9.7 09/25/2019    CALCIUM 9.6 08/29/2019     Lab Results   Component Value Date    WBC 4.3 10/01/2019    WBC 2.8 (L) 09/25/2019    WBC 4.3 09/17/2019    HGB 11.8 (L) 10/01/2019    HGB 12.7 09/25/2019    HGB 13.3 09/17/2019    HCT 36.1 10/01/2019    HCT 38.9 09/25/2019    HCT 41.7 09/17/2019    MCV 93 10/01/2019    MCV 94 09/25/2019    MCV 93 09/17/2019     (H) 10/01/2019     09/25/2019     09/17/2019     AST   Date Value Ref Range Status   10/01/2019 18 0 - 40 U/L Final     ALT   Date Value Ref Range Status   10/01/2019 22 0 - 45 U/L Final     Alkaline Phosphatase   Date Value Ref Range Status   10/01/2019 81 45 - 120 U/L Final     Albumin   Date Value Ref Range Status   10/01/2019 2.6 (L) 3.5 - 5.0 g/dL Final      Pertinent Radiology  Radiology  Results: reviewed impressions  EKG Results: not reviewed.    E/M time/ total time: 60 minutes    >50% of time during encounter was spent with family and patient on counseling and/or care coordination as documented above.    present for support and to help with resource discussion.  Discussed strategies for managing shortness of breath and cough.  Referral made to [facility].  (2:30-3:30 PM)    Colleen Damon, CHARANJIT/CNP  Palliative Care Services  [phone]

## 2021-06-01 NOTE — PROGRESS NOTES
[facility] Hematology and Oncology Progress Note    Patient: Humera Espinal  MRN: [id number]  Date of Service: 10/01/2019      Reason for Visit    Chief Complaint   Patient presents with     HE Cancer     Breast Cancer       Assessment and Plan  Cancer Staging  Malignant neoplasm of lower-inner quadrant of female breast (H)  Staging form: Breast, AJCC 8th Edition  - Clinical stage from 2/7/2019: Stage IV (cT4b, cN1, pM1, GX, ER: Positive, WI: Positive, HER2: Negative) - Signed by Evaristo Zhang MD on 2/9/2019      ECOG Performance   ECOG Performance Status: 2     Distress Assessment  Distress Assessment Score: No distress    Pain  Currently in Pain: No/denies  Pain Score (Initial OR Reassessment): No/Denies Pain    #.  Metastatic ER +/WI +/HER-2- right breast cancer (lower outer quadrant)-metastases to pleura and possible pulmonary nodules and cytology confirmed malignant pleural effusion.  Initiated palliative intent letrozole on 2/7/2019.  Progression of disease noted in August 2019, therapy changed to Faslodex and palbociclib.  After 1 cycle of treatment, patient decided to stop all cancer treatment and wishes symptom focused care.     Patient requested me to review the risks and benefits of ongoing cancer treatment.  I again clarified with her that the cancer treatment will be palliative intent.  The treatment goal is to keep the cancer under control and to live longer with the price of potential side effects.  Patient is very clear that she does not want to put herself to more side effects from the medication at this point.  After discussion about risks benefits and alternative, she made a decision that she want to focus on symptom focused care.  Then she asked me about how much time she has left.  I explained to her that generally we would say 6 months unless life expectancy, however it will be very variable depending on the tumor biology and the patient clinical status.  I explained to her that I would  focus on the quality of life rather than measuring the amount of time she has here.  She has already had evaluation with hospice at the time of diagnosis about 8 months ago and she will call them back again to sign on hospice.  She already applied for our [facility] hospice home but she is willing to stay in her house with family support as long as she can until she goes to hospice home.   Will discontinue Faslodex and palbociclib.   I am greatly honored taking care of Ms. Espinal in the last several months and I truly admired her philosophy of life.   I will be available for any questions arise in the future.    #.  Malignant right-sided pleural effusion   She has pleural drainage catheter.  She will continue to drain as needed.  I explained to her that hospice will continue to advice and manage the drainage catheter.    Problem List    1. Malignant neoplasm of lower-inner quadrant of right female breast, unspecified estrogen receptor status (H)        ______________________________________________________________________________    History of Present Illness    Mrs. Espinal resents today accompanied by her friend.  She has progressive shortness of breath with activity.  She came in a wheelchair today.  She also has dry cough.  She reported she has severe constipation with Ibrance and she had to stop about 4 piece left from the last cycle.  She now has a pleural drainage catheter and she is doing every other day, drained about a cup each time.  She was told she need to do every day and she wants to clarify that order.  She has been thinking about ongoing treatment and now she has decided that she does not want to continue any cancer treatment.     Pain Status  Currently in Pain: No/denies    Review of Systems    Oncology Nurse Assessment/CMA Intake: Constitutional  Constitutional (WDL): Exceptions to WDL  Fatigue: Fatigue not relieved by rest - Limiting instrumental ADL  Fever: None  Chills: None  Weight Gain: 5 -  <10% from baseline(4# 9/27/19)  Weight Loss: None  Neurosensory  Neurosensory (WDL): Exceptions to WDL  Peripheral Motor Neuropathy: None  Ataxia: Asymptomatic, clinical or diagnostic observations only, intervention not indicated  Peripheral Sensory Neuropathy: None  Confusion: None  Syncope: None  Eye   Eye Disorder (WDL): All eye disorder elements are within defined limits  Ear  Ear Disorder (WDL): Exceptions to WDL  Ear Pain: Mild Pain(left ear-intermittent, discomfort)  Tinnitus: None  Cardiovascular  Cardiovascular (WDL): All cardiovascular elements are within defined limits  Pulmonary  Respiratory (WDL): Exceptions to WDL  Cough: Moderate symptoms, medical intervention indicated, limiting instrumental ADL(dry)  Dyspnea: Shortness of breath with minimal exertion, limiting instrumental ADL  Hypoxia: None  Gastrointestinal  Gastrointestinal (WDL): Exceptions to WDL(has noticed bright red bleeding after BMs)  Anorexia: Loss of appetite without alteration in eating habits  Constipation: Persistent symptoms with regular use of laxatives or enemas, limiting instrumental ADL(Last BM today 10/1/19-small)  Diarrhea: None  Dysphagia: None  Esophagitis: None  Nausea: None  Pharyngitis: None  Vomiting: None(feels like could when has coughing spells)  Dysgeusia: None  Dry Mouth: None  Genitourinary  Genitourinary (WDL): All genitourinary elements are within defined limits  Lymphatic  Lymph (WDL): All lymph disorder elements are within defined limits  Musculoskeletal and Connective Tissue  Musculoskeletal and Connetive Tissue Disorders (WDL): All Musculoskeletal and Connetive Tissue Disorder elements are within defined limits  Integumentary  Integumentary (WDL): All integumentary elements are within defined limits  Patient Coping  Patient Coping: Accepting;Open/discussion  Distress Assessment  Distress Assessment Score: No distress  Accompanied by  Accompanied by: Friend(Friend, Myesha)  Oral Chemo Adherence         Past  History  Past Medical History:   Diagnosis Date     Hydropneumothorax      Hyperlipidemia      Malignant pleural effusion      Metastatic breast cancer (H)      Osteoarthritis      Osteopenia      Osteoporosis      Skin cancer 2017     Trapped lung        Physical Exam    Recent Vitals 10/1/2019   Height -   Weight 126 lbs 11 oz   BSA (m2) 1.61 m2   /69   Pulse 109   Temp 97.4   Temp src 1   SpO2 94   Some recent data might be hidden     General: alert, awake, not in acute distress  HEENT: Head: Normal, normocephalic, atraumatic.  Eye: Normal external eye, conjunctiva, lids cornea, JESUS.  Ears:  Non-tender.  Nose: Normal external nose, mucus membranes and septum.  Pharynx: Dental Hygiene adequate. Normal buccal mucosa. Normal pharynx.  Neck / Thyroid: Supple, no masses, nodes, nodules or enlargement.  Lymphatics: No abnormally enlarged lymph nodes.  Chest: Normal chest wall and respirations.  Crackles at the right lung base.  She has a pleural drainage catheter.  Heart: S1 S2 RRR, no murmur.   Abdomen: abdomen is soft without significant tenderness, masses, organomegaly or guarding  Extremities: normal strength, tone, and muscle mass  Skin: normal. no rash or abnormalities  CNS: non focal.      Lab Results    Recent Results (from the past 168 hour(s))   Comprehensive Metabolic Panel   Result Value Ref Range    Sodium 137 136 - 145 mmol/L    Potassium 4.3 3.5 - 5.0 mmol/L    Chloride 100 98 - 107 mmol/L    CO2 28 22 - 31 mmol/L    Anion Gap, Calculation 9 5 - 18 mmol/L    Glucose 112 70 - 125 mg/dL    BUN 24 8 - 28 mg/dL    Creatinine 1.18 (H) 0.60 - 1.10 mg/dL    GFR MDRD Af Amer 53 (L) >60 mL/min/1.73m2    GFR MDRD Non Af Amer 44 (L) >60 mL/min/1.73m2    Bilirubin, Total 0.2 0.0 - 1.0 mg/dL    Calcium 9.7 8.5 - 10.5 mg/dL    Protein, Total 7.7 6.0 - 8.0 g/dL    Albumin 2.9 (L) 3.5 - 5.0 g/dL    Alkaline Phosphatase 72 45 - 120 U/L    AST 12 0 - 40 U/L    ALT 12 0 - 45 U/L   HM1 (CBC with Diff)   Result Value  Ref Range    WBC 2.8 (L) 4.0 - 11.0 thou/uL    RBC 4.15 3.80 - 5.40 mill/uL    Hemoglobin 12.7 12.0 - 16.0 g/dL    Hematocrit 38.9 35.0 - 47.0 %    MCV 94 80 - 100 fL    MCH 30.6 27.0 - 34.0 pg    MCHC 32.6 32.0 - 36.0 g/dL    RDW 14.6 (H) 11.0 - 14.5 %    Platelets 190 140 - 440 thou/uL    MPV 9.8 8.5 - 12.5 fL    Neutrophils % 56 50 - 70 %    Lymphocytes % 36 20 - 40 %    Monocytes % 6 2 - 10 %    Eosinophils % 0 0 - 6 %    Basophils % 2 0 - 2 %    Neutrophils Absolute 1.5 (L) 2.0 - 7.7 thou/uL    Lymphocytes Absolute 1.0 0.8 - 4.4 thou/uL    Monocytes Absolute 0.2 0.0 - 0.9 thou/uL    Eosinophils Absolute 0.0 0.0 - 0.4 thou/uL    Basophils Absolute 0.0 0.0 - 0.2 thou/uL   Comprehensive Metabolic Panel   Result Value Ref Range    Sodium 136 136 - 145 mmol/L    Potassium 4.1 3.5 - 5.0 mmol/L    Chloride 100 98 - 107 mmol/L    CO2 28 22 - 31 mmol/L    Anion Gap, Calculation 8 5 - 18 mmol/L    Glucose 145 (H) 70 - 125 mg/dL    BUN 18 8 - 28 mg/dL    Creatinine 0.98 0.60 - 1.10 mg/dL    GFR MDRD Af Amer >60 >60 mL/min/1.73m2    GFR MDRD Non Af Amer 54 (L) >60 mL/min/1.73m2    Bilirubin, Total 0.2 0.0 - 1.0 mg/dL    Calcium 9.2 8.5 - 10.5 mg/dL    Protein, Total 7.3 6.0 - 8.0 g/dL    Albumin 2.6 (L) 3.5 - 5.0 g/dL    Alkaline Phosphatase 81 45 - 120 U/L    AST 18 0 - 40 U/L    ALT 22 0 - 45 U/L   HM1 (CBC with Diff)   Result Value Ref Range    WBC 4.3 4.0 - 11.0 thou/uL    RBC 3.88 3.80 - 5.40 mill/uL    Hemoglobin 11.8 (L) 12.0 - 16.0 g/dL    Hematocrit 36.1 35.0 - 47.0 %    MCV 93 80 - 100 fL    MCH 30.4 27.0 - 34.0 pg    MCHC 32.7 32.0 - 36.0 g/dL    RDW 15.9 (H) 11.0 - 14.5 %    Platelets 460 (H) 140 - 440 thou/uL    MPV 8.9 8.5 - 12.5 fL    Neutrophils % 59 50 - 70 %    Lymphocytes % 26 20 - 40 %    Monocytes % 12 (H) 2 - 10 %    Eosinophils % 1 0 - 6 %    Basophils % 2 0 - 2 %    Neutrophils Absolute 2.5 2.0 - 7.7 thou/uL    Lymphocytes Absolute 1.1 0.8 - 4.4 thou/uL    Monocytes Absolute 0.5 0.0 - 0.9 thou/uL     Eosinophils Absolute 0.0 0.0 - 0.4 thou/uL    Basophils Absolute 0.1 0.0 - 0.2 thou/uL       Imaging    Ir Tunneled Pleural Catheter Insertion    Result Date: 9/27/2019  [facility] LOCATION: [facility] DATE: 9/27/2019 PROCEDURE: ULTRASOUND AND FLUOROSCOPIC-GUIDED TUNNELED PLEURAL DRAINAGE CATHETER PLACEMENT 1. Ultrasound-guided access into the right pleural space. A permanent image was stored. 2. Placement of a tunneled Aspira 15.5 Fr tunneled pleural drainage catheter. 3. Moderate sedation. INTERVENTIONAL RADIOLOGIST: Hola Ellis MD INDICATION: Malignant pleural effusion. Need for recurrent thoracentesis. Tunneled pleural drain to be placed for symptomatic/palliative intent. CONSENT: The risks, benefits and alternatives of the procedure were discussed with the patient  in detail. All questions were answered. Informed consent was given to proceed with the procedure. MODERATE SEDATION: Versed 0.5 mg IV; Fentanyl 25 mcg IV. During the time out, immediately prior to the administration of medications, the patient was reassessed for adequacy to receive conscious sedation.  Under physician supervision, Versed and fentanyl  were administered for moderate sedation. Pulse oximetry, heart rate and blood pressure were continuously monitored by an independent trained observer. The physician spent 15 minutes of face-to-face sedation time with the patient. CONTRAST: None ANTIBIOTICS: None FLUOROSCOPIC TIME: 0.4 minutes. RADIATION DOSE: Air Kerma: 9 mGy. COMPLICATIONS: No immediate complications. STERILE BARRIER TECHNIQUE: Maximum sterile barrier technique was used. Cutaneous antisepsis was performed at the operative site with application of 2% chlorhexidine and large sterile drape. Prior to the procedure, the  and assistant performed hand hygiene and wore hat, mask, sterile gown, and sterile gloves during the entire procedure. PROCEDURE:  Ultrasound was performed of the right thorax to assess for an  adequate access point in the right pleural space. Under real-time sonographic guidance, an 18 gauge needle was inserted into the right pleural space. A wire was coiled toward the caudal aspect of the pleural cavity. Sequential soft tissue dilation and placement of a peel-away catheter was performed over  the needle. At a site medial and cranial to the peritoneal access site, a subcutaneous tunnel was created along the interspace requiring a second incision. Using this access, an Aspira drainage catheter was placed under fluoroscopic guidance with its tip in the pleural space. Pleural fluid immediately flowed back within the drain confirming adequate placement. Fluoroscopy was used to check that the most proximal sidehole was well within the pleural cavity. The catheter may be used immediately. FINDINGS: Ultrasound shows a moderate hydropneumothorax with suitable access site along the right lateral chest. At the completion of the study, the catheter tubing lies around the right posterior pleural space.     1. Successful placement of an right Aspira tunneled pleural drainage catheter, as discussed above. 2. Catheter was connected to suction and 450 mL of serosanguineous fluid was drained. There was spurring noted during the drainage, likely related to the pleural air. Suspect ability to drain optimal fluid may be positional. This was discussed with patient and her family. PLAN: 1.  Will do teaching of pulmonary uterus in the recovery area. No more than 1 L drained per day. Begin drainage at every other day intervals. 2.  Assess clinical response. If lung is severely trapped, this may not significantly improved symptoms even with intermittent drainage. ____________________________________________________________________ CPT codes for physician reference only: 28435 [id number]     Us Thoracentesis    Result Date: 9/25/2019  EXAM: 1. RIGHT THORACENTESIS 2. ULTRASOUND GUIDANCE LOCATION: [facility] DATE/TIME:  9/25/2019 2:47 PM INDICATION: Pleural effusion. PROCEDURE: Informed consent obtained. Time out performed. The chest was prepped and draped in sterile fashion. 8 mL of 1 % lidocaine was infused into the local soft tissues. Under direct ultrasound guidance, a 5 Indonesian Yueh catheter system was placed into the pleural effusion. 0.7 liters of clear yellow fluid were removed and sent to lab, if requested. Patient tolerated procedure. RADIOLOGIC SUPERVISION AND INTERPRETATION: ULTRASOUND GUIDANCE: Images demonstrate the pleural effusion. Catheter seen in good position.     CONCLUSION: Status post right ultrasound-guided thoracentesis. Reference CPT Code: 75866     Us Thoracentesis    Result Date: 9/10/2019  EXAM: 1. RIGHT THORACENTESIS 2. ULTRASOUND GUIDANCE LOCATION: [facility] DATE/TIME: 9/10/2019 1:28 PM INDICATION: Pleural effusion. PROCEDURE: Informed consent obtained. Time out performed. The chest was prepped and draped in sterile fashion. 10 mL of 1 % lidocaine was infused into the local soft tissues. Under direct ultrasound guidance, a 5 Indonesian Yueh catheter system was placed into the pleural effusion. 0.550 liters of clear yellow fluid were removed and sent to lab, if requested. Patient tolerated procedure well. RADIOLOGIC SUPERVISION AND INTERPRETATION: ULTRASOUND GUIDANCE: Images demonstrate the pleural effusion. Catheter seen in good position.     CONCLUSION: Status post right ultrasound-guided thoracentesis. Reference CPT Code: 30731    Signed by: Evaristo Zhang MD

## 2021-06-01 NOTE — PROGRESS NOTES
Pt came into infusion clinic for her Faslodex and labs as ordered. Pt tolerated this well. Pt left infusion clinic via ambulatory and will RTC as sched.

## 2021-06-01 NOTE — TELEPHONE ENCOUNTER
Called Humera to follow up after her 10/1 apt with Dr. Zhang. Writer was not on the  campus on the day of her apt. She did have her friend accompany her to clinic and she does feel that she has a good understanding of the content discussed at her apt. She did not tolerate her most recent therapy and it was discontinued. She had a few days prior to her apt to consider her options of what she wanted to do. She elected to withdraw from active treatment as she she values quality of life over a longer life. She reports that she has a good julita and she has peace with her decision. She believes in an after life with her creator and this is comforting to her. She has already completed paperwork for [facility] for when the time comes for her to leaver her home to receive care. She is currently in her own home and has family nearby to assist. She has apt with a provider to discuss morphine for sx management. She does feel a slight improvement with how she is feeling after stopping her systemic treatment. She was appreciative of the phone call and asked that writer include her in prayers. Support and encouragement given to her in her journey...Symone Villagomez RN

## 2021-06-01 NOTE — PRE-PROCEDURE
Procedure Name: Right tunneled drainage catheter placement   Date/Time: 9/27/2019 8:23 AM  Written consent obtained?: Yes  Risks and benefits: Risks, benefits and alternatives were discussed  Consent given by: patient  Expected level of sedation: moderate  ASA Class: Class 3- Severe systemic disease, definite functional limitations  Mallampati: Grade 3- soft palate visible, posterior pharyngeal wall not visible  Patient states understanding of procedure being performed: Yes  Patient's understanding of procedure matches consent: Yes  Appropriately NPO: yes  Lungs: other (comment)  Lung exam comment: right diminished   Heart: normal heart sounds and rate  History & Physical reviewed: History and physical reviewed and no updates needed  Statement of review: I have reviewed the lab findings, diagnostic data, medications, and the plan for sedation

## 2021-06-01 NOTE — TELEPHONE ENCOUNTER
Called Humera, per Dr. Zhang's request to clarify if she is considering hospice (per Dr. León's note patient requesting hospice and morphine) or palliative for pain control. Message left for her to return my call/HAYLEY Sanz RN    8005:  Patient returned my call.  She said she is wanting Palliative Care for morphine to help with her breathing.  She denies pain.  She is unable to get into Palliative Care for 2 weeks.  I told her I would follow-up with ANNETTE Gavin to see if she could get in before then and they would call her back, if something opens up sooner. I did tell her, if her breathing worsens to call 911. She verbalized understanding and thanked me for calling. Dr. Zhang updated/MONSERRAT Sanz RN

## 2021-06-01 NOTE — TELEPHONE ENCOUNTER
Reviewed labs from today with Dr. Zhang.  Labs stable-nothing concerning at this time. Follow-up, as scheduled, on 10/1/19. Called patient with this message. She verbalized understanding and thanked me for calling/HAYLEY Sanz RN

## 2021-06-01 NOTE — PROGRESS NOTES
Pt. Presents to infusion center ambulating. Pt. C/o increased shortness of breath the past week. Pt. Has intermittant dry cough with at times when coughing she states that she is incontinent of stool. 02 sats were 90%. Dr. Zhang notified. Oxygen placed per nasal cannula at 2 % and 02 sats increased to 97 %. Dr. Zhang to review her lab results and and assess patient.

## 2021-06-01 NOTE — PROGRESS NOTES
Pt and son watched DVD on taking care of the Aspira drainage system.  They feel ok to do this at home.

## 2021-06-01 NOTE — PROGRESS NOTES
Talked with Humera again this morning to let her know that Dr. León had [placed an order for Palliative care to see her so she could get some pain medication to help with her breathing. Call placed to [facility] nurse navigator for Cancer Care and will let the nurse for Humera know  And try to get her seen in palliative care ASAP.

## 2021-06-01 NOTE — TELEPHONE ENCOUNTER
Spoke with the patient and relayed the message below from Dr. Escobar:   This is possible.  But I think you need to talk to her oncologist about it.  I would stay on it until you discuss with your oncologist     Jose Escobar MD   Internal medicine   [facility]   [phone]   [email or web address]      She verbalized understanding, but states this message was never suppose to go to Dr. Goodson.  This was suppose to have gone to her oncologist.  That is who she was trying to get in touch with in the first place.  Patient states that she will try and reach the oncologist again with this message.  She had no further questions at this time.  Melvina PUENTE CMA/PANCHO....................4:50 PM

## 2021-06-01 NOTE — PROGRESS NOTES
Dr. Zhang assessed patient and was going to have an appt. Set up for her in pulmonology and they will contact her tomorrow. Pt. Was instructed to go to the ER if she experiences worsening shortness of breath. Pt. Verbalized understanding. Pt. D/c'd per w/c.

## 2021-06-01 NOTE — PROCEDURES
[facility]    Procedure: Right plueral tunneled drainage catheter placement  Date/Time: 9/27/2019 9:30 AM  Performed by: Hola Ellis MD  Authorized by: Hola Ellis MD       Universal Protocol    Site marked: Yes    Prior images obtained and reviewed: Yes    Required items: required blood products, implants, devices, and special equipment available    Patient identity confirmed: verbally with patient, arm band and provided demographic data    Reevaluation: Patient was reevaluated immediately before administering moderate or deep sedation or anesthesia    Confirmation checklist: patient's identity using two indicators, relevant allergies, procedure was appropriate and matched the consent or emergent situation and correct equipment/implants were available    Time out: Immediately prior to procedure a time out was called to verify the correct patient, procedure, equipment, support staff and site/side marked as required    Universal Protocol: Joint Commission Universal Protocol was followed    Preparation: Patient was prepped and draped in the usual sterile fashion    ESBL (mL): 5    Anesthesia    Local anesthesia used?: Yes    Anesthesia: local infiltration    Local anesthetic: lidocaine 1% without epinephrine    Anesthetic total (mL): 10    Sedation    Patient sedation: Yes    Sedation type: moderate (conscious) sedation    Sedation: fentanyl and midazolam    Vital signs: Vital signs monitored during sedation  Specimens: none  Complications: None  Condition: Stable  Plan: Please refer to separate IR procedure note for findings, details, and plan.    Post-procedure    Description of procedure: Please refer to separate IR procedure note for findings, details, and plan.    Patient tolerance: Patient tolerated the procedure well with no immediate complications   Length of time physician present for 1:1 monitoring during sedation: 15    Comments: Please refer to separate IR procedure  note for findings, details, and plan.

## 2021-06-02 ENCOUNTER — RECORDS - HEALTHEAST (OUTPATIENT)
Dept: ADMINISTRATIVE | Facility: CLINIC | Age: 86
End: 2021-06-02

## 2021-06-02 VITALS — HEIGHT: 63 IN | BODY MASS INDEX: 25.16 KG/M2 | WEIGHT: 142 LBS

## 2021-06-02 VITALS — BODY MASS INDEX: 23.53 KG/M2 | WEIGHT: 137.1 LBS

## 2021-06-02 VITALS — WEIGHT: 138.1 LBS | BODY MASS INDEX: 23.7 KG/M2

## 2021-06-02 VITALS — WEIGHT: 140 LBS | HEIGHT: 64 IN | BODY MASS INDEX: 23.9 KG/M2

## 2021-06-02 VITALS — WEIGHT: 145 LBS | BODY MASS INDEX: 25.69 KG/M2 | HEIGHT: 63 IN

## 2021-06-03 VITALS
HEIGHT: 64 IN | BODY MASS INDEX: 21.29 KG/M2 | RESPIRATION RATE: 24 BRPM | HEART RATE: 96 BPM | DIASTOLIC BLOOD PRESSURE: 68 MMHG | SYSTOLIC BLOOD PRESSURE: 98 MMHG | WEIGHT: 124.7 LBS | OXYGEN SATURATION: 95 %

## 2021-06-03 VITALS
DIASTOLIC BLOOD PRESSURE: 69 MMHG | BODY MASS INDEX: 21.75 KG/M2 | HEART RATE: 109 BPM | OXYGEN SATURATION: 94 % | SYSTOLIC BLOOD PRESSURE: 111 MMHG | WEIGHT: 126.7 LBS | TEMPERATURE: 97.4 F

## 2021-06-03 VITALS — HEIGHT: 64 IN | BODY MASS INDEX: 22.2 KG/M2 | WEIGHT: 130 LBS

## 2021-06-03 VITALS
OXYGEN SATURATION: 95 % | HEIGHT: 64 IN | RESPIRATION RATE: 24 BRPM | SYSTOLIC BLOOD PRESSURE: 102 MMHG | WEIGHT: 126.5 LBS | DIASTOLIC BLOOD PRESSURE: 76 MMHG | HEART RATE: 110 BPM | BODY MASS INDEX: 21.6 KG/M2

## 2021-06-03 VITALS — BODY MASS INDEX: 23.1 KG/M2 | WEIGHT: 134.6 LBS

## 2021-06-03 VITALS — WEIGHT: 129.5 LBS | BODY MASS INDEX: 22.23 KG/M2

## 2021-06-03 VITALS — WEIGHT: 135.6 LBS | BODY MASS INDEX: 23.28 KG/M2

## 2021-06-03 VITALS — BODY MASS INDEX: 22.38 KG/M2 | WEIGHT: 130.4 LBS

## 2021-06-03 VITALS — WEIGHT: 122.19 LBS | HEIGHT: 64 IN | BODY MASS INDEX: 20.86 KG/M2

## 2021-06-04 NOTE — TELEPHONE ENCOUNTER
Candida Nurse from [facility] called and left message wanting to notify us of a death.  Patient  20 at 2040.  This was informational message only but if questions she can be called back at [phone].  Message sent to Dr. Zhang/HAYLEY Sanz RN

## 2021-06-04 NOTE — TELEPHONE ENCOUNTER
FYI - Status Update  Who is Calling: Nursing home  Update:  2020 2040h  Okay to leave a detailed message?:  No return call needed     Breast CA

## 2021-06-04 NOTE — TELEPHONE ENCOUNTER
Dr. Goodson,    [facility] only.    Francesca FLORES LPN .......... 9:46 AM  01/02/20

## 2021-06-14 NOTE — PROGRESS NOTES
"OFFICE VISIT NOTE    Subjective:   Chief Complaint:  Follow-up (tingling in Rt thigh X 3 weeks at night but now is happening during the day. no other symptoms)    83-year-old woman whose been experiencing some tingling of the right anterolateral thigh for the past 3 weeks.  No definite injury though she did fall raking leaves about that same time.  She thinks the tingling of the leg may have preceded the minor fall.  There has been no weakness in the leg.  Symptoms are never severe or  causing a great deal of any pain.  There has never been a rash or blisters in the area.    Current Outpatient Prescriptions   Medication Sig     calcipotriene (DOVONEX) 0.005 % topical solution APPLY TO THE AFFECTED AREA TWICE DAILY AS DIRECTED     CHOLECALCIFEROL, VITAMIN D3, ORAL Take 600 Units by mouth daily.     docoshexanoic acid-eicosapent 500 mg (FISH OIL) 500-100 mg cap capsule Take 1,000 mg by mouth daily.     hydrocortisone 2.5 % cream Apply topically 2 (two) times a day. (Patient taking differently: Apply topically daily. Psoriasis or rosacea)     polyvinyl alcohol (LIQUIFILM TEARS) 1.4 % ophthalmic solution Administer 1 drop to both eyes as needed for dry eyes.     timolol maleate (TIMOPTIC) 0.5 % ophthalmic solution Administer 1 drop to both eyes daily.       Review of Systems:  A comprehensive review of systems is negative except for the comments above    Objective:    Ht 5' 3.25\" (1.607 m)  Wt 144 lb (65.3 kg)  BMI 25.31 kg/m2  GENERAL: No acute distress.  She appears the same as when I saw her last a couple of years ago.  Blood pressure is 138/86.  Pulse in the 70s.  She is afebrile.  Skin is normal without any lesions suggesting herpes or zoster.  Good range of motion to the right hip.  Circulation and sensation are intact.  No palpable abnormalities on anterior right thigh or in the inguinal region.  She can stand on heels and toes without difficulty.  Reflexes are normal    Assessment & Plan   Humera Espinal " is a 83 y.o. female.    Anesthesia of the right anterior lateral thigh suggesting meralgia paresthetica.  Symptoms are not severe.  I have asked her to avoid any restrictive clothing.  Apply cold packs twice daily for 15 minutes.  Try Advil 1 or 2 tablets twice daily.  If symptoms should change or increase, she should call me.    Diagnoses and all orders for this visit:    Meralgia paresthetica of right side        Don Goodson MD  Transcription using voice recognition software, may contain typographical errors.

## 2021-06-16 PROBLEM — Z79.899 ENCOUNTER FOR MEDICATION MANAGEMENT: Status: ACTIVE | Noted: 2019-04-05

## 2021-06-16 PROBLEM — J90 PLEURAL EFFUSION: Status: ACTIVE | Noted: 2019-04-05

## 2021-06-16 PROBLEM — C50.319 MALIGNANT NEOPLASM OF LOWER-INNER QUADRANT OF FEMALE BREAST (H): Chronic | Status: ACTIVE | Noted: 2019-01-29

## 2021-06-16 PROBLEM — Z79.811 AROMATASE INHIBITOR USE: Status: ACTIVE | Noted: 2019-04-05

## 2021-06-17 NOTE — PATIENT INSTRUCTIONS - HE
Patient Instructions by Carlton León MD at 9/12/2019  8:00 AM     Author: Carlton León MD Service: -- Author Type: Physician    Filed: 9/12/2019  8:36 AM Encounter Date: 9/12/2019 Status: Signed    : Carlton León MD (Physician)       Patient Education     Pleural Effusion    The pleura is a smooth double membrane that surrounds the lungs. It separates the lungs from the chest wall. One side of the pleura attaches to the lung. The other side attaches to the chest wall. This membrane makes it easier for the chest to inflate and deflate as you breathe without rubbing against the ribs. You normally have a small amount of lubricating fluid (pleural fluid) between the pleural membranes.  A pleural effusion is when too much fluid collects in the space between the two pleural membranes (pleural space). As the amount of fluid increases, it begins to press on the lung. This makes it harder to take a full breath.  There are two types of pleural effusion:    Inflammatory. This is caused by a lung disease like pneumonia or lung cancer, which irritates the pleura.    Noninflammatory. This is caused by abnormal fluid pressures inside the lungs. The pressure can be caused by congestive heart failure (CHF). In CHF, extra fluid collects inside the lung tissues because of a weakened heart muscle. This extra fluid then leaks into the pleural space.  Pleural effusion may cause any of these symptoms:    Shortness of breath    Rapid breathing    Cough or hiccups    Sharp chest pain that hurts more with coughing or deep breathing  A small pleural effusion may cause no symptoms at all.  Treatment will be directed at the cause of the pleural effusion. If you are having a lot of trouble breathing, a thoracentesis procedure may be done to remove the fluid from the pleural space. This involves placing a needle or tube (catheter) through the chest wall into the pleural space. This usually gives relief right away. But the fluid may  gradually return.  Home care  Follow these guidelines when caring for yourself at home:    Rest until you feel better. Exerting yourself may make your symptoms worse.    Your healthcare provider may have prescribed medicines to treat the underlying cause of the pleural effusion. Take these exactly as directed.  Follow-up care  Follow up with your healthcare provider, or as advised.  When to seek medical advice  Call your healthcare provider right away if any of these occur:    Fever of 100.4 F (38 C) or higher, or as directed by your healthcare provider  Call 911  Call 911 if any of the following occur:    Shortness of breath gets worse    Chest pain gets worse    Coughing up blood    Weakness, dizziness, or fainting  Date Last Reviewed: 9/13/2015 2000-2017 [employer]. [address] [place] [address], [place] [address]. All rights reserved. This information is not intended as a substitute for professional medical care. Always follow your healthcare professional's instructions.

## 2021-06-17 NOTE — PATIENT INSTRUCTIONS - HE
Patient Instructions by Evaristo Zhang MD at 2/7/2019 10:45 AM     Author: Evaristo Zhang MD Service: -- Author Type: Physician    Filed: 2/7/2019 12:03 PM Encounter Date: 2/7/2019 Status: Addendum    : Evaristo Zhang MD (Physician)    Related Notes: Original Note by Evaristo Zhang MD (Physician) filed at 2/7/2019 11:57 AM       Patient Education   Patient Education     Calcium 9435-4615 mg daily in 2 divided dose  Vitamin D 2633-0158 IU daily in 2 divided dose  Start letrozole(Femera) today, daily.   Return to clinic 1 week after starting Ibrance. [facility] will deliver it to you once it is approved.    Palbociclib capsules  Brand Name: [facility]brance  What is this medicine?  PALBOCICLIB (pal chelsie SYE klib) is a medicine that targets proteins in cancer cells and stops the cancer cells from growing. It is used to treat breast cancer.  How should I use this medicine?  Take this medicine by mouth with a glass of water. Follow the directions on the prescription label. Take this medicine with food. Avoid grapefruit and grapefruit juice while you are taking this medicine. Swallow the capsule whole. Do not cut, crush or chew this medicine. Take your medicine at regular intervals. Do not take it more often than directed. Do not stop taking except on your doctor's advice.  Talk to your pediatrician regarding the use of this medicine in children. Special care may be needed.  What side effects may I notice from receiving this medicine?  Side effects that you should report to your doctor or health care professional as soon as possible:    allergic reactions like skin rash, itching or hives, swelling of the face, lips, or tongue    dizziness    mouth sores    low blood counts - this medicine may decrease the number of white blood cells, red blood cells and platelets. You may be at increased risk for infections and bleeding.    pain, tingling, numbness in the hands or feet    severe or persistent diarrhea, nausea,  vomiting    signs and symptoms of infection like fever or chills; cough; sore throat; pain or trouble passing urine    signs of decreased platelets or bleeding - nosebleed, bruising, pinpoint red spots on the skin, black, tarry stools, blood in the urine    signs of decreased red blood cells - unusually weak or tired, feeling faint or lightheaded, falls  Side effects that usually do not require medical attention (report to your doctor or health care professional if they continue or are bothersome):    decreased appetite    hair thinning or hair loss    mild diarrhea    nausea    weak or tired  What may interact with this medicine?  This medicine may interact with the following medications:    alfentanil    antiviral medicines for HIV or AIDS    boceprevir    bosentan    carbamazepine    certain medicines for fungal infections like ketoconazole, itraconazole, posaconazole, voriconazole    clarithromycin    cyclosporine    ergot alkaloids like dihydroergotamine, ergotamine    everolimus    fentanyl    grapefruit juice    midazolam    modafinil    nafcillin    nefazodone    phenobarbital    phenytoin    pimozide    quinidine    rifampin    sirolimus    Moi's Wort    tacrolimus    telaprevir    telithromycin    verapamil  What if I miss a dose?  If you miss a dose or vomit after taking a dose, do not take another dose on that day. Take your next dose at your regular time.  Where should I keep my medicine?  Keep out of the reach of children.  Store between 20 and 25 degrees C (68 and 77 degrees F). Throw away any unused medicine after the expiration date.  What should I tell my health care provider before I take this medicine?  They need to know if you have any of these conditions:    infection (especially a virus infection such as chickenpox, cold sores, or herpes)    low blood counts, like low white cell, platelet, or red cell counts    an unusual or allergic reaction to palbociclib, other medicines, foods, dyes,  or preservatives    pregnant or trying to get pregnant    breast-feeding  What should I watch for while using this medicine?  Visit your doctor for regular check ups. Report any side effects. Continue your course of treatment unless your doctor tells you to stop. You will need blood work done while you are taking this medicine.  Do not become pregnant while taking this medicine or for at least 3 weeks after stopping it. Women should inform their doctor if they wish to become pregnant or think they might be pregnant. Men should not father a child while taking this medicine and for 3 months after stopping it. There is a potential for serious side effects to an unborn child. Men should inform their doctors if they wish to father a child later. This medicine may lower sperm counts. Talk to your health care professional or pharmacist for more information. Do not breast-feed an infant while taking this medicine or for 3 weeks after the last dose.  Avoid taking products that contain aspirin, acetaminophen, ibuprofen, naproxen, or ketoprofen unless instructed by your doctor. These medicines may hide a fever.  Be careful brushing and flossing your teeth or using a toothpick because you may get an infection or bleed more easily. If you have any dental work done, tell your dentist you are receiving this medicine.  Call your doctor or health care professional for advice if you get a fever, chills or sore throat, or other symptoms of a cold or flu. Do not treat yourself. This drug decreases your body's ability to fight infections. Try to avoid being around people who are sick.  This medicine may increase your risk to bruise or bleed. Call your doctor or health care professional if you notice any unusual bleeding.  This drug may make you feel generally unwell. This is not uncommon, as chemotherapy can affect healthy cells as well as cancer cells. Report any side effects. Continue your course of treatment even though you feel ill  unless your doctor tells you to stop.  NOTE:This sheet is a summary. It may not cover all possible information. If you have questions about this medicine, talk to your doctor, pharmacist, or health care provider. Copyright  2018 [employer]           Letrozole tablets  Brand Name: Femara  What is this medicine?  LETROZOLE (LET keo zole) blocks the production of estrogen. It is used to treat breast cancer.  How should I use this medicine?  Take this medicine by mouth with a glass of water. You may take it with or without food. Follow the directions on the prescription label. Take your medicine at regular intervals. Do not take your medicine more often than directed. Do not stop taking except on your doctor's advice.  Talk to your pediatrician regarding the use of this medicine in children. Special care may be needed.  What side effects may I notice from receiving this medicine?  Side effects that you should report to your doctor or health care professional as soon as possible:    allergic reactions like skin rash, itching, or hives    bone fracture    chest pain    signs and symptoms of a blood clot such as breathing problems; changes in vision; chest pain; severe, sudden headache; pain, swelling, warmth in the leg; trouble speaking; sudden numbness or weakness of the face, arm or leg    vaginal bleeding  Side effects that usually do not require medical attention (report to your doctor or health care professional if they continue or are bothersome):    bone, back, joint, or muscle pain    dizziness    fatigue    fluid retention    headache    hot flashes, night sweats    nausea    weight gain  What may interact with this medicine?  Do not take this medicine with any of the following medications:    estrogens, like hormone replacement therapy or birth control pills  This medicine may also interact with the following medications:    dietary supplements such as androstenedione or DHEA    prasterone    tamoxifen  What if I  miss a dose?  If you miss a dose, take it as soon as you can. If it is almost time for your next dose, take only that dose. Do not take double or extra doses.  Where should I keep my medicine?  Keep out of the reach of children.  Store between 15 and 30 degrees C (59 and 86 degrees F). Throw away any unused medicine after the expiration date.  What should I tell my health care provider before I take this medicine?  They need to know if you have any of these conditions:    high cholesterol    liver disease    osteoporosis (weak bones)    an unusual or allergic reaction to letrozole, other medicines, foods, dyes, or preservatives    pregnant or trying to get pregnant    breast-feeding  What should I watch for while using this medicine?  Tell your doctor or healthcare professional if your symptoms do not start to get better or if they get worse.  Do not become pregnant while taking this medicine or for 3 weeks after stopping it. Women should inform their doctor if they wish to become pregnant or think they might be pregnant. There is a potential for serious side effects to an unborn child. Talk to your health care professional or pharmacist for more information. Do not breast-feed while taking this medicine or for 3 weeks after stopping it.  This medicine may interfere with the ability to have a child. Talk with your doctor or health care professional if you are concerned about your fertility.  Using this medicine for a long time may increase your risk of low bone mass. Talk to your doctor about bone health.  You may get drowsy or dizzy. Do not drive, use machinery, or do anything that needs mental alertness until you know how this medicine affects you. Do not stand or sit up quickly, especially if you are an older patient. This reduces the risk of dizzy or fainting spells.  You may need blood work done while you are taking this medicine.  NOTE:This sheet is a summary. It may not cover all possible information. If you  have questions about this medicine, talk to your doctor, pharmacist, or health care provider. Copyright  2018 [employer]

## 2021-06-18 NOTE — LETTER
Letter by Don Goodson MD at      Author: Don Goodson MD Service: -- Author Type: --    Filed:  Encounter Date: 1/23/2019 Status: (Other)       Humera Espinal  [address] [place] [address]             January 23, 2019         Dear Ms. Espinal,    Below are the results from your recent visit:    Resulted Orders   HM2(CBC w/o Differential)   Result Value Ref Range    WBC 9.3 4.0 - 11.0 thou/uL    RBC 4.56 3.80 - 5.40 mill/uL    Hemoglobin 14.0 12.0 - 16.0 g/dL    Hematocrit 42.5 35.0 - 47.0 %    MCV 93 80 - 100 fL    MCH 30.7 27.0 - 34.0 pg    MCHC 33.0 32.0 - 36.0 g/dL    RDW 10.9 (L) 11.0 - 14.5 %    Platelets 315 140 - 440 thou/uL    MPV 8.2 7.0 - 10.0 fL   Comprehensive Metabolic Panel   Result Value Ref Range    Sodium 139 136 - 145 mmol/L    Potassium 4.4 3.5 - 5.0 mmol/L    Chloride 101 98 - 107 mmol/L    CO2 23 22 - 31 mmol/L    Anion Gap, Calculation 15 5 - 18 mmol/L    Glucose 71 70 - 125 mg/dL    BUN 28 8 - 28 mg/dL    Creatinine 1.01 0.60 - 1.10 mg/dL    GFR MDRD Af Amer >60 >60 mL/min/1.73m2    GFR MDRD Non Af Amer 52 (L) >60 mL/min/1.73m2    Bilirubin, Total 0.4 0.0 - 1.0 mg/dL    Calcium 9.7 8.5 - 10.5 mg/dL    Protein, Total 7.0 6.0 - 8.0 g/dL    Albumin 3.3 (L) 3.5 - 5.0 g/dL    Alkaline Phosphatase 79 45 - 120 U/L    AST 17 0 - 40 U/L    ALT 17 0 - 45 U/L    Narrative    Fasting Glucose reference range is 70-99 mg/dL per  American Diabetes Association (ADA) guidelines.   CEA (Carcinoembryonic Antigen)   Result Value Ref Range    CEA 2.7 0.0 - 3.0 ng/mL    Narrative    Method is Abbott Carcinoembryonic Antigen (CEA) using  Chemiluminescent Microparticle Immunoassay.  WHO 1st International  Std (73/601).    Smoking may increase values.  Increased levels may be seen in patients with primary  colorectal cancer or other malignancies including  breast, gastrointestinal tract, liver, ovarian,  pancreatic, and prostatic cancers. CEA may be useful  in monitoring patients with  known malignancies.    Serum markers are not specific for malignancy and values  may vary by method.       Humera, recent labs are reviewed.  Your white blood count and hemoglobin were normal.  Your electrolytes as well as kidney function tests and liver function tests, were normal.  CEA which is a tumor marker was normal.  Chest x-ray however does show some fluid in the right lung.  This may be related to your cough that you had about 4 weeks ago.  However, you will require follow-up for this abnormality.  I would like to see you in the office again in 1 week for a follow-up chest x-ray and reexamination.  As I mentioned in the office yesterday, believe you are going to require further surgery for the right breast abnormality as well.  So, you are going to be busy seeing doctors for a a while.    Please call with questions or contact us using [employer].    Sincerely,        Electronically signed by Don Goodson MD

## 2021-06-18 NOTE — PROGRESS NOTES
[facility] Clinic Note    Humera Espinal   84 y.o. female    Date of Visit: 6/21/2018  Chief Complaint   Patient presents with     Fall       ASSESSMENT/PLAN  1. Cervicalgia       ---------------------------------------------    Ms. Espinal is an 84-year-old woman who fell on her head about 10 days ago, has been dealing with mild right occipital/neck discomfort and stiffness with slight decreased range of motion.  She has had a trivial amount of lightheadedness since the fall.  Since she is not on blood thinners, has not had strokelike symptoms, we both decided not to do head CT, likewise we can clear her cervical spine clinically and she does not need x-ray.  Both tests were offered, though she wanted to take a minimalist approach, which is reasonable.  I did provide some physical therapy exercises for the neck to do gently.  She does not feel she needs anything for pain.  We did talk about the possibility of mild postconcussion syndrome.  Taking it easy, not over exerting herself, and sleeping when she has tired of the main points of management.  She was reassured by this information.    No Follow-up on file.      SUBJECTIVE  Humera Espinal is a pleasant 84-year-old woman who presents for symptoms related to a fall.  About 10 days ago, she was walking on asphalt and a large dog caused her to fall and strike the right posterior/lateral aspect of her skull as she fell backwards.  She had an abrasion there and a nurse was on the scene who told her it looked less swollen after that.  She iced it for the next day or 2 and the swelling went down.  Off and on since then she has had some discomfort in the right occipital area radiating down to the same side of the neck, very low-grade discomfort, which she would put at 2/10 in the discomfort scale.  The discomfort is more when she rotates her head to the right.  She sometimes feels a gentle clicking sensation in her neck.  Over the last day, she had a  fleeting sensation of being lightheaded, but was very mild.  The RN triage nurse recommended being seen in the clinic.    There are no symptoms radiating down the arm.  She has not had any otorrhea.  She denies any headache, blurred vision, etc.  She denies any other symptoms other than what she noted above.    She has proud of her health, does not take any prescription pills.  She is not very concerned about her neck, though she did wonder about possible concussion.        Medications, allergies, and problem list were reviewed and updated    There is no problem list on file for this patient.    Past Medical History:   Diagnosis Date     Hyperlipidemia      Osteoarthritis      Osteopenia      Current Outpatient Prescriptions   Medication Sig Dispense Refill     calcipotriene (DOVONEX) 0.005 % topical solution APPLY TO THE AFFECTED AREA TWICE DAILY AS DIRECTED 60 mL 0     CHOLECALCIFEROL, VITAMIN D3, ORAL Take 600 Units by mouth daily.       docoshexanoic acid-eicosapent 500 mg (FISH OIL) 500-100 mg cap capsule Take 1,000 mg by mouth daily.       hydrocortisone 2.5 % cream Apply topically 2 (two) times a day. (Patient taking differently: Apply topically daily. Psoriasis or rosacea) 30 g 0     polyvinyl alcohol (LIQUIFILM TEARS) 1.4 % ophthalmic solution Administer 1 drop to both eyes as needed for dry eyes.  0     timolol maleate (TIMOPTIC) 0.5 % ophthalmic solution Administer 1 drop to both eyes daily.       No current facility-administered medications for this visit.      No Known Allergies    EXAM  Vitals:    06/21/18 1541   BP: 124/70   Patient Site: Left Arm   Patient Position: Sitting   Cuff Size: Adult Regular   Pulse: 72   SpO2: 98%   Weight: 143 lb (64.9 kg)         General: Alert, pleasant, no distress  ENT: PERRLA, EOMI, TMs normal bilaterally  Neck: Reduced range of motion with rotation to the right at 45  and extension limited to 10 , otherwise full.  No tenderness with palpation of spinous processes in  the cervical spine, or at the occipital ridge, or on the scalp.  Skin: No abrasions on the scalp.  She does have some psoriatic red patches at the hairline.  Neurologic: Cranial nerves II through XII intact, normal gait    Results reviewed: Reviewed RN triage note    This visit lasted a total of 25 minutes.  Over 50% of the time was spent counseling regarding he management of neck pain.       Salvador Farris DO  Internal Medicine  [facility]

## 2021-06-19 NOTE — LETTER
Letter by Carlton León MD at      Author: Carlton León MD Service: -- Author Type: --    Filed:  Encounter Date: 9/12/2019 Status: (Other)         Evaristo Zhang MD  [facility]  [address] [place] [id number]                                  September 12, 2019    Patient: Humera Espinal   MR Number: [id number]   YOB: 1934   Date of Visit: 9/12/2019     Dear Dr. Vicente MD:    Thank you for referring Humera Espinal to me for evaluation. Below are the relevant portions of my assessment and plan of care.    If you have questions, please do not hesitate to call me. I look forward to following Humera along with you.    Sincerely,        Carlton León MD          CC  MD Mau Daniel Avraham, MD  9/12/2019  8:51 AM  Sign at close encounter  Pulmonary Clinic Outpatient Consultation    Assessment and Plan:   85 year old lady with history of metastatic breast cancer, HLD, presents for evaluation of shortness of breath and pleural effusion. We reviewed her CT scan today. The effusion is very large and takes up a majority of the right hemithorax. It is associated with trapped lung and hydropneumothorax. I discussed that repeated thoracentesis and even tunneled pleural catheter are unlikely to lead to re-expansion of the lung since it looks trapped. However a tunneled pleural catheter could be a good option and allow her to drain the effusion at her discretion. Discussed that her disease is incurable and she would likely continue to have progressive dyspnea and there is no single treamtent that will prevent or reverse this. She is realistic about her prognosis and wants to have a good quality of life at this point. Discussed that thoracotomy and decortication is not a good option for her given her age and frailty and she agreed.    Recommendations:  - she agrees to proceed with Pleurex catheter placement, order placed for IR tunneled pleural catheter   - recommend palliative  care/hospice referral when she's ready  - encouraged her to continue to eat, try to gain weight, exercise and remain active as able  - flu vaccine today. Recommend pneumonia vaccine series as well, she will discuss with her PMD.     She will see Dr. Zhang at the end of this month and she can follow up with me as needed if she has specific questions about the pleural disease or the pleurex catheter, otherwise I would defer ongoing management of her metastatic disease to oncology.  All questions answered.      CCx: hydropneumothorax, metastatic breast cancer    HPI: 85 year old lady with history of metastatic breast cancer, HLD, presents for evaluation of shortness of breath and pleural effusion. She is referred by Dr. Zhang at [facility]. She is currently getting palliative chemo for breast cancer which was noted to be progressive as of her last scans. She has significant pleural disease on the right with hydropneumothorax and trapped lung on right. She gets shortness of breath with minimal activity. She coughs frequently, non-productive especially in the shower when she is exposed to moisture. She's lost 15lbs since Jan when she was diagnosed. No chest pain, chest pressure, palpitations, peripheral edema.  She gets thora's every 2 weeks.  The effusion was noted to be malignant in Jan 2019.  She is a never smoker and was actually quite healthy prior to her diagnosis of breast cancer. no prior history of lung diseases.   She knows that her disease is terminal but says she has an important project to accomplish through her [employer] and wants to live long enough to see it through. She was given 6-12 months to live from her diagnosis back in January.  She is in touch with hospice/palliative care team but is not on hospice currently.     ROS:  A 12-system review was obtained and was negative with the exception of the symptoms endorsed in the history of present illness.    PMH:  Past Medical History:   Diagnosis  Date   ? Hyperlipidemia    ? Osteoarthritis    ? Osteopenia    ? Osteoporosis    ? Skin cancer 2017       PSH:  Past Surgical History:   Procedure Laterality Date   ? HYSTERECTOMY  1962    age 28   ? KNEE ARTHROSCOPY Right    ? US THORACENTESIS  1/31/2019   ? US THORACENTESIS  2/14/2019   ? US THORACENTESIS  3/15/2019   ? US THORACENTESIS  4/19/2019   ? US THORACENTESIS  5/2/2019   ? US THORACENTESIS  5/15/2019   ? US THORACENTESIS  5/29/2019   ? US THORACENTESIS  6/28/2019   ? US THORACENTESIS  7/15/2019   ? US THORACENTESIS  7/29/2019   ? US THORACENTESIS  8/14/2019   ? US THORACENTESIS  8/27/2019   ? US THORACENTESIS  9/10/2019       Allergies:  No Known Allergies    Family HX:  Family History   Problem Relation Age of Onset   ? Lung cancer Sister    ? Hyperlipidemia Sister    Family history reviewed and is non-contributory.     Social Hx:  Social History     Socioeconomic History   ? Marital status:      Spouse name: Not on file   ? Number of children: Not on file   ? Years of education: Not on file   ? Highest education level: Not on file   Occupational History   ? Not on file   Social Needs   ? Financial resource strain: Not on file   ? Food insecurity:     Worry: Not on file     Inability: Not on file   ? Transportation needs:     Medical: Not on file     Non-medical: Not on file   Tobacco Use   ? Smoking status: Never Smoker   ? Smokeless tobacco: Never Used   Substance and Sexual Activity   ? Alcohol use: No     Frequency: Never   ? Drug use: No   ? Sexual activity: Never   Lifestyle   ? Physical activity:     Days per week: Not on file     Minutes per session: Not on file   ? Stress: Not on file   Relationships   ? Social connections:     Talks on phone: Not on file     Gets together: Not on file     Attends Bahai service: Not on file     Active member of club or organization: Not on file     Attends meetings of clubs or organizations: Not on file     Relationship status: Not on file   ?  "Intimate partner violence:     Fear of current or ex partner: Not on file     Emotionally abused: Not on file     Physically abused: Not on file     Forced sexual activity: Not on file   Other Topics Concern   ? Not on file   Social History Narrative    , 2 sons       Current Meds:  Current Outpatient Medications   Medication Sig Dispense Refill   ? biotin 2,500 mcg cap Take 1 capsule by mouth daily.     ? calcipotriene (DOVONEX) 0.005 % topical solution APPLY TO AFFECTED AREA TWICE A DAY 60 mL 0   ? CHOLECALCIFEROL, VITAMIN D3, ORAL Take 600 Units by mouth daily.     ? docoshexanoic acid-eicosapent 500 mg (FISH OIL) 500-100 mg cap capsule Take 1,000 mg by mouth daily.     ? palbociclib (IBRANCE) 125 mg cap capsule Take 1 capsule (125 mg total) by mouth daily. Take one capsule daily for 21 days and then do not take for 7 days. Repeat. 21 capsule 0   ? timolol maleate (TIMOPTIC) 0.5 % ophthalmic solution Administer 1 drop to both eyes daily.     ? UNABLE TO FIND Take 1 Dose by mouth 2 (two) times a day. Combination pack of vitamins       No current facility-administered medications for this visit.        Physical Exam:  Ht 5' 4\" (1.626 m)   Wt 124 lb 11.2 oz (56.6 kg)   LMP 01/01/1962   BMI 21.40 kg/m     Gen: awake, alert, oriented, no distress  HEENT: nasal turbinates are unremarkable, no oropharyngeal lesions, no cervical or supraclavicular lymphadenopathy  CV: RRR, no M/G/R  Resp: absent BS right base with dullness to percussion. Clear on left. No wheezing.   Abd: soft, nontender, no palpable organomegaly  Skin: no apparent rashes  Ext: no cyanosis, clubbing or edema  Neuro: alert, nonfocal    Labs:  Reviewed    Pleural fluid cytology + for met breast cancer    Glucose 42  11% lymph 6% \"other cell\"    Imaging studies:  CT chest abd/pelvis  IMPRESSION:   CONCLUSION:  1.  Extensive right-sided pleural metastases with probable trapped lung and mixture of air and fluid in the right pleural space. The " pleural thickening has mildly progressed from the prior study.  2.  Multiple small left pulmonary nodules suspicious for pulmonary metastasis are stable however there is a new small left pleural effusion.  3.  1 cm enhancing lesion right lobe of the liver is unchanged no other focal hepatic lesions no lymphadenopathy in the abdomen or pelvis  4.  Mild left pyelocaliectasis is unchanged    PFT's   None available    Carlton León MD (Avi)  [facility]  Pager [phone]  Clinic [phone]

## 2021-06-19 NOTE — PROGRESS NOTES
"OFFICE VISIT NOTE    Subjective:   Chief Complaint:  Wrist Pain (left wrist hurts after falling a month ago) and Neck Pain (still having cracking in her neck and a scab on top of head)    84-year-old woman who fell about a month ago when she was knocked down by a dog.  She injured her left wrist and did hit her head on the concrete.  She has slight concussion.  She was seen at another clinic.  She has been treated with exercises in her neck is gradually getting better.  She still has some discomfort in the left wrist and left thumb.    Current Outpatient Prescriptions   Medication Sig     calcipotriene (DOVONEX) 0.005 % topical solution APPLY TO THE AFFECTED AREA TWICE DAILY AS DIRECTED     CHOLECALCIFEROL, VITAMIN D3, ORAL Take 600 Units by mouth daily.     docoshexanoic acid-eicosapent 500 mg (FISH OIL) 500-100 mg cap capsule Take 1,000 mg by mouth daily.     hydrocortisone 2.5 % cream Apply topically 2 (two) times a day. (Patient taking differently: Apply topically daily. Psoriasis or rosacea)     timolol maleate (TIMOPTIC) 0.5 % ophthalmic solution Administer 1 drop to both eyes daily.       Review of Systems:  A comprehensive review of systems is negative except for the comments above    Objective:    Pulse 82  Ht 5' 3.25\" (1.607 m)  Wt 142 lb (64.4 kg)  SpO2 98%  BMI 24.96 kg/m2  GENERAL: No acute distress.  She obviously has significant osteoarthritis of the left thumb.  There is also some swelling over the wrist especially the distal radius area.  Good flexion and good extension.  Normal supination and pronation.  Circulation sensation are intact.  Handgrip is normal.    Assessment & Plan   Hmuera Espinal is a 84 y.o. female.    Left wrist injury.  Some this may be a simple sprain or contusion.  Will x-ray to make sure she has not suffered a small fracture.    Diagnoses and all orders for this visit:    Wrist injury, left, initial encounter  -     XR Wrist Left 3 or More VWS; Future; Expected date: " 7/18/18        Don Goodson MD  Transcription using voice recognition software, may contain typographical errors.

## 2021-06-22 NOTE — PROGRESS NOTES
"OFFICE VISIT NOTE    Subjective:   Chief Complaint:  Cough (heaviness in chest, chills X 2 weeks )    84-year-old woman is in with a complaint of having a slight cough off and on for the past 2 weeks.  There is been no associated fever.  Occasional minor chills.  No pleurisy.  No earache.  No sore throat.  No rashes.    Current Outpatient Medications   Medication Sig     calcipotriene (DOVONEX) 0.005 % topical solution APPLY TO THE AFFECTED AREA TWICE DAILY AS DIRECTED     CHOLECALCIFEROL, VITAMIN D3, ORAL Take 600 Units by mouth daily.     docoshexanoic acid-eicosapent 500 mg (FISH OIL) 500-100 mg cap capsule Take 1,000 mg by mouth daily.     hydrocortisone 2.5 % cream Apply topically 2 (two) times a day. (Patient taking differently: Apply topically daily. Psoriasis or rosacea)     timolol maleate (TIMOPTIC) 0.5 % ophthalmic solution Administer 1 drop to both eyes daily.     benzonatate (TESSALON PERLES) 100 MG capsule Take 1 capsule (100 mg total) by mouth 3 (three) times a day as needed for cough.       Review of Systems:  A comprehensive review of systems is negative except for the comments above    Objective:    Pulse 69   Temp 97.8  F (36.6  C)   Ht 5' 3.25\" (1.607 m)   Wt 145 lb (65.8 kg)   SpO2 96%   BMI 25.48 kg/m    GENERAL: No acute distress.  She is afebrile.  No respiratory distress.  Respiratory rate is approximately 12-14.  ENT examination is pretty much on normal.  No exudate in the throat.  No significant erythema.  Lungs are free of rales wheezes or rhonchi.  Cough is dry sounding.  No postnasal drip.  No adenopathy in the neck.  Heart shows a regular rhythm without gallop.    Assessment & Plan   Humera Espinal is a 84 y.o. female.    This is probably a viral tracheobronchitis with slight cough.  Antibiotics probably not indicated.  Would use Tessalon Perles as needed.    Diagnoses and all orders for this visit:    Tracheobronchitis  -     benzonatate (TESSALON PERLES) 100 MG capsule  " Dispense: 15 capsule; Refill: 1        Don Goodson MD  Transcription using voice recognition software, may contain typographical errors.

## 2021-06-23 NOTE — TELEPHONE ENCOUNTER
Humera called.  Said she has reviewed the list of side effects on Ibrance and has decided she will not take that medication.  She has reviewed the side effects for Letrozole too but has already picked that up from the pharmacy so will at least try that one.  She wanted to know how these meds worked, so I reviewed with her the mechanism of action for both meds and her particular cancer.  She is struggling with the feeling that how meds with such harsh side effects can be good for her.  I assured her that every single side effect must be listed and most people tolerate these meds very well with very few side effects and she may also tolerate them with little side effects.  I told her Dr. Zhang will be monitoring her very carefully and if her blood counts go too low on the Ibrance, there are steps to be taken such as taking a small break or decreasing the dose to help her with that side effect.  She would still like me to let Dr. Zhang and the dept that is working on her insurance for the Ibrance know that she will not be taking it.  We also talked about hospice if she decides she does not want treatment for her metastatic breast cancer.     Humera said she has been drinking a mixture that she feels kills cancer.  She states it consists of aloe vera leaves, honey and a small amount of whiskey.  She said a friend, who had his pancreatic cancer cured, is bringing her a certain mushroom on Sunday.  I highly encouraged her to make sure she knows exactly what kind of mushroom it is as many mushrooms can be fatal when ingested.  I told her to let us know what she is taking before she takes anything at all to make sure they won't interfere with her other meds or be harmful to her.  She agrees.    She plans her further workup next week and will see Dr. Zhang as planned on 3-7-19.

## 2021-06-23 NOTE — TELEPHONE ENCOUNTER
Called [facility] again and Dr. Cerna still hasn't gotten to the results and they can't send them to us until she looks at them. They will be sent over this afternoon per assistant.  Joaquina Sawyer CSS

## 2021-06-23 NOTE — PROGRESS NOTES
"OFFICE VISIT NOTE    Subjective:   Chief Complaint:  Follow-up (dermatology visit Dr. Cerna in [place]. had a biopsy taken)    4-year-old woman who recently was treated for bronchitis.  She also has had a lump in the lower right breast area.  She did see a dermatologist yesterday.  Several facial lesions were treated.  Sounds like she had keratoses on the face.  However, the nodular lesion in the right breast was also biopsied.  Patient tells me she has had a lump and abnormal shape of the breast for several months.    Current Outpatient Medications   Medication Sig     calcipotriene (DOVONEX) 0.005 % topical solution APPLY TO THE AFFECTED AREA TWICE DAILY AS DIRECTED     CHOLECALCIFEROL, VITAMIN D3, ORAL Take 600 Units by mouth daily.     docoshexanoic acid-eicosapent 500 mg (FISH OIL) 500-100 mg cap capsule Take 1,000 mg by mouth daily.     hydrocortisone 2.5 % cream Apply topically 2 (two) times a day. (Patient taking differently: Apply topically daily. Psoriasis or rosacea)     timolol maleate (TIMOPTIC) 0.5 % ophthalmic solution Administer 1 drop to both eyes daily.     benzonatate (TESSALON PERLES) 100 MG capsule Take 1 capsule (100 mg total) by mouth 3 (three) times a day as needed for cough.       PSFHx: Tobacco Status:  She  reports that  has never smoked. she has never used smokeless tobacco.    Review of Systems:  A comprehensive review of systems is negative except for the comments above    Objective:    Pulse 90   Ht 5' 3.25\" (1.607 m)   Wt 142 lb (64.4 kg)   SpO2 96%   BMI 24.96 kg/m    GENERAL: No acute distress.  Pressure is 134/82.  Pulse 85.  She is afebrile.  No jaundice.  No supraclavicular adenopathy.  Lungs seem clear.  Heart does show a regular rhythm.  The right breast was examined.  She has some seborrheic keratoses on the inferior aspect of the breast but the breast itself seems to have a mass and is ulcerated and inverting.  The mass is firm and hard on palpation.  I do not feel anything " in the right axilla.    Assessment & Plan   Humera Espinal is a 84 y.o. female.    Very suspicious right breast mass.  I suspect this is a cancer.  We will await the dermatologist biopsy.  She  needs a diagnostic mammogram.  I will also refer her to surgery as I suspect she will need a  more definitive surgical procedure to remove the mass in the right breast.  Left breast needs full evaluation as well.  I will have her sign a form so we can get the report of her biopsies from dermatology.  Diagnoses and all orders for this visit:    Breast tumor    Malignant neoplasm of lower-inner quadrant of right female breast, unspecified estrogen receptor status (H)  -     HM2(CBC w/o Differential)  -     Comprehensive Metabolic Panel  -     CEA (Carcinoembryonic Antigen)  -     XR Chest 2 Views  -     Mammo Diagnostic Bilateral            Don Goodson MD  Transcription using voice recognition software, may contain typographical errors.

## 2021-06-23 NOTE — PROGRESS NOTES
OFFICE VISIT NOTE    Subjective:   Chief Complaint:  No chief complaint on file.    84-year-old woman who is back for follow-up regarding a right breast lesion that was biopsied by dermatology.  Since then, her mammogram and ultrasound have shown it is a breast cancer on the right side.  Left breast is normal.  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  She also has a right pleural effusion.  She had a cough about 4 or 5 weeks ago.  Cough is less now.  No fevers or chills.  Some anorexia.  No dyspnea.  No pleurisy.    Current Outpatient Medications   Medication Sig     benzonatate (TESSALON PERLES) 100 MG capsule Take 1 capsule (100 mg total) by mouth 3 (three) times a day as needed for cough.     calcipotriene (DOVONEX) 0.005 % topical solution APPLY TO THE AFFECTED AREA TWICE DAILY AS DIRECTED     CHOLECALCIFEROL, VITAMIN D3, ORAL Take 600 Units by mouth daily.     docoshexanoic acid-eicosapent 500 mg (FISH OIL) 500-100 mg cap capsule Take 1,000 mg by mouth daily.     hydrocortisone 2.5 % cream Apply topically 2 (two) times a day. (Patient taking differently: Apply topically daily. Psoriasis or rosacea)     timolol maleate (TIMOPTIC) 0.5 % ophthalmic solution Administer 1 drop to both eyes daily.       PSFHx: Tobacco Status:  She  reports that  has never smoked. she has never used smokeless tobacco.    Review of Systems:  A comprehensive review of systems is negative except for the comments above    Objective:    [facility] 01/01/1962   GENERAL: No acute distress.  Weight is stable.  Blood pressure 108/72.  Oxygen saturation 93% on room air.  Pulse is 92.  Lungs have decreased breath sounds at the right lung base consistent with a pleural effusion yet.  Heart shows a regular rhythm without significant murmur or gallop.  Right breast shows a mass on the lower aspect of the breast.  Skin is inverted.  She has other seborrheic keratoses around the mass.  I do not feel anything in the right  axilla.  Abdomen is without ascites or any obvious organomegaly.    Assessment & Plan   Humera Espinal is a 84 y.o. female.    Newly diagnosed breast cancer.  Tumors in the right breast and is ulcerating.  She will need to see surgery next week.  I suspect the breast will need to be removed.  I am going to order a CT of the chest abdomen and pelvis.  Repeat today's chest x-ray.  X-ray today shows no change in large right pleural effusion.  Hemoglobin, liver function test, kidney function tests, and CEA titer, were normal last week.  Check an INR.  We will also arrange for ultrasound-guided thoracentesis of the right pleural effusion.  Diagnoses and all orders for this visit:    Pleural effusion  -     XR Chest 2 Views    Malignant neoplasm of lower-inner quadrant of right female breast, unspecified estrogen receptor status (H)        The following low BMI interventions were performed this visit: weight gain advised    Don Goodson MD  Transcription using voice recognition software, may contain typographical errors.

## 2021-06-23 NOTE — PROGRESS NOTES
[facility] Follow Up Note    Humera Espinal   84 y.o. female    Date of Visit: 1/18/2019    Chief Complaint   Patient presents with     Rectal Bleeding     last 3 days     Cough     Subjective  This is an 84-year-old lady who is a patient of Dr. Don Goodson.  She comes in this morning because of some rectal bleeding.  She saw Dr. Goodson a couple of weeks ago with a respiratory infection which continues to slowly improve.  She has noticed over the past 3 days some significant bleeding from the rectum.  She does tell me that it is slow down and almost disappeared today.  Last week 1 day she had significant diarrhea but it did not last and has had no problems since.  She denies any rectal or abdominal pain.  No recent changes in diet or medication.  No issues with constipation or recent travel.  She is otherwise feeling pretty much in her usual state of health.    ROS A comprehensive review of systems was performed and was otherwise negative    Medications, allergies, and problem list were reviewed and updated    Exam  General Appearance:   On examination her blood pressure is 122/60.  Weight is 142 pounds and height is 63 inches.  BMI is 24.96.    Heart rhythm is stable with a rate of 70 and no ectopy.    On rectal examination she does have a couple of tiny external hemorrhoids.  I am unable to detect any masses or anything internally.  No tenderness noted.    The patient is alert and oriented x3.      Assessment/Plan  1. Rectal bleeding       Rectal bleeding over the past 3 days.  I suspect this is due to the small hemorrhoids.  I do not have a good reason why they flared up but I do not see anything else at this point.  As her symptoms have nearly cleared today and the examination is otherwise unremarkable I would not do any additional testing at this time.  I did advise her to follow-up with Dr. Goodson if the bleeding reoccurs and he would determine what the next step needs to  be.      Dale Morgan MD      Current Outpatient Medications on File Prior to Visit   Medication Sig     benzonatate (TESSALON PERLES) 100 MG capsule Take 1 capsule (100 mg total) by mouth 3 (three) times a day as needed for cough.     calcipotriene (DOVONEX) 0.005 % topical solution APPLY TO THE AFFECTED AREA TWICE DAILY AS DIRECTED     CHOLECALCIFEROL, VITAMIN D3, ORAL Take 600 Units by mouth daily.     docoshexanoic acid-eicosapent 500 mg (FISH OIL) 500-100 mg cap capsule Take 1,000 mg by mouth daily.     hydrocortisone 2.5 % cream Apply topically 2 (two) times a day. (Patient taking differently: Apply topically daily. Psoriasis or rosacea)     timolol maleate (TIMOPTIC) 0.5 % ophthalmic solution Administer 1 drop to both eyes daily.     No current facility-administered medications on file prior to visit.      No Known Allergies  Social History     Tobacco Use     Smoking status: Never Smoker     Smokeless tobacco: Never Used   Substance Use Topics     Alcohol use: Not on file     Drug use: Not on file

## 2021-06-23 NOTE — TELEPHONE ENCOUNTER
Informed Mrs Espinal that the cancer was in the lungs and in the fluid .  She took the news very stoically .I answered her questions to the best of my ability .  Follow up is already arranged at the breast cancer clinic  .

## 2021-06-23 NOTE — TELEPHONE ENCOUNTER
Patient was told to call Dr. Goodson to get a test.     Went to derm, Dr. [facility] last Monday and did biopsy of breast, saw Dr. Goodson on Tuesday and needed results of biopsy of breast. Wondering if you received those?     [facility]ed [facility] and Dr. Cerna has not reviewed results yet. Triage note was placed to have her review those results and fax to Dr. Goodson today.     Mary Harding LPN

## 2021-06-23 NOTE — CONSULTS
[facility] Hematology and Oncology Consult Note    Patient: Humera Espinal  MRN: [id number]  Date of Service: 02/07/2019      Reason for Visit    Chief Complaint   Patient presents with     HE Cancer     Breast Cancer     Referred by Dr. Wilson regarding metastatic breast cancer.    Assessment/Plan    ECOG Performance    1  Distress Assessment  Distress Assessment Score: No distress    #. Metastatic ER+/WA+/HER2- right breast cancer (lower outer quadrant)- mets to pleural, ?pulmonary nodules and cytology confirmed malignant pleural effusion.   Reviewed her pathology and CT images with the patient and her son. I informed her that she has stage 4, metastatic breast cancer. She was originally confused that she has both lung and breast cancer and I clarified with her that breast cancer metastasized to lung lining resulting effusion and possible nodules.    I discussed treatment goal at this point is palliative, not cure. There would not be any role for surgery or radiation at this point. Discussed about palliative treatment option of first line with letrozole with palbociclib (preferred) or letrozole. She stated that she would not take chemotherapy and I do not recommend chemotherapy at this point due to clinical stability and strongly ER+ breast cancer.  I reviewed the side effects to each drugs and hand out given. Later she decided she does not want to take palbociclib due to listed many potential side effects.   I asked her to start Ca/vitamin D two times a day.   Requested bone scan to complete staging.   She asked my opinion on her diet and I mentioned to her that I do not have enough knowledge to comment, however, I recommended to eat healthy food, moderation in intake and food that appealing to her.   Follow up with me on 3/7/2019 with labs.     #. Malignant right pleural effusion   Request US thoracentesis for therapeutic. She is aware that she will need periodic throacentesis until breast cancer is  controlled.    Problem List    1. Malignant neoplasm of lower-inner quadrant of right female breast, unspecified estrogen receptor status (H)  NM Bone Scan Whole Body    CA 27.29, Breast Tumor Marker()    US Thoracentesis    HM1(CBC and Differential)   2. Pleural effusion  US Thoracentesis   3. Encounter for medication management  HM1(CBC and Differential)     ______________________________________________________________________________    Staging History    Cancer Staging  Malignant neoplasm of lower-inner quadrant of female breast (H)  Staging form: Breast, AJCC 8th Edition  - Clinical stage from 2/7/2019: Stage IV (cT4b, cN1, pM1, GX, ER: Positive, KY: Positive, HER2: Negative) - Signed by Evaristo Zhang MD on 2/9/2019      History    Mrs. Humera Espinal is a very pleasant 84 y.o. female accompanied by her son, Dale.    She initially seek medical attention a month ago with several weeks history of irritating cough. Then, she had a dermatology visit and found right breast ulceration and tumor. Skin punch biopsy was obtained on 1/21/2019 and it showed metastatic carcinoma c/w breast origin (ER positive 97%, strong staining), KY positive (96%, strong staining), HER-2 by IHC was negative (1+).   CT CAP completed on 1/31/2019 showed moderate right pleural effusion with atelectasis in right lower and middle lobe, pleural implants, right breast mass abuts and may invading the chest wall, numerous ~2mm indeteminate pulmonary nodules in left lung.   She underwent right thoracentesis on 1/31/2019 and removed 800 ml turbid deep suraj-colored fluid. Cytology confirmed positive malignant cells c/w metastatic breast carcinoma.    She reports that she has been very healthy all her life. She thinks she noted changes in her right breast for sometime,but never thought about cancer and never brought it up. She denies pain, but noted shortness of breath with moderate exertion. Noted return of cough and she feels that  the fluid in the lung is coming up again. Appetite is good. She is following some sort of diet that was advertised for cure of cancer.    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 Lives in a house and her 2 sons live very close to her. They are very involved.     Past History    Past Medical History:   Diagnosis Date     Hyperlipidemia      Osteoarthritis      Osteopenia      Osteoporosis      Skin cancer 2017    Family History   Problem Relation Age of Onset     Lung cancer Sister      Hyperlipidemia Sister       Past Surgical History:   Procedure Laterality Date     HYSTERECTOMY  1962    age 28     KNEE ARTHROSCOPY Right      US THORACENTESIS  1/31/2019    Social History     Socioeconomic History     Marital status:      Spouse name: Not on file     Number of children: Not on file     Years of education: Not on file     Highest education level: Not on file   Social Needs     Financial resource strain: Not on file     Food insecurity - worry: Not on file     Food insecurity - inability: Not on file     Transportation needs - medical: Not on file     Transportation needs - non-medical: Not on file   Occupational History     Not on file   Tobacco Use     Smoking status: Never Smoker     Smokeless tobacco: Never Used   Substance and Sexual Activity     Alcohol use: No     Frequency: Never     Drug use: No     Sexual activity: No   Other Topics Concern     Not on file   Social History Narrative    , 2 sons        Allergies    No Known Allergies    Review of Systems    General  General (WDL): Exceptions to WDL  Fatigue: Yes - Recent (Less than 3 months)  Fever: None  Generalized Muscle Weakness: None  Weight Loss: No  ENT  ENT (WDL): Exceptions to WDL  Vertigo (Dizziness): None  Changes in vision: None  Glasses or Contacts: Yes - Recent (Less than 3 months)  Hearing loss: Yes - Recent (Less than 3 months)  Hearing Aids: None  Tinnitus: None  Pain/Pressure in ears: None  Sinus Congestion/Drainage: None  Hoarseness:  None  Sore Throat: None  Dental Problems: Yes - Chronic (Greater than 3 months)  Dentures: Yes - Recent (Less than 3 months)(removable upper dentures)  Respiratory  Respiratory (WDL): Exceptions to WDL  Dyspnea: Yes - Recent (Less than 3 months)  hemoptysis: None  Is patient on O2?: None  Cough: Yes - Recent (Less than 3 months)  Non-Cardiac Chest Pain: None  Cardiovascular  Cardiovascular (WDL): Exceptions to WDL  Palpitations: None  Edema Limbs: None  Irregular Heart Beat: Yes - Recent (Less than 3 months)  Chest Pain: None  Lightheadedness: None  Endocrine  Endocrine (WDL): All endocrine elements are within defined limits  Gastrointestinal  Gastrointestinal (WDL): Exceptions to WDL  Difficulty Swallowing: None  Heartburn: None  Constipation: None  Yellowish skin and/or eyes: None  Blood from rectum: Yes - Recent (Less than 3 months)  Nausea and Vomiting: None  Abdominal Pain: None  Diarrhea: None  Have had black or tan stools?: None  Hemorrhoids: Yes - Recent (Less than 3 months)  Poor Appetite: None  Musculoskeletal  Musculoskeletal (WDL): Exceptions to WDL  Range of Motion Limitation: Yes - Recent (Less than 3 months)  Joint pain: None  Back Pain: None  Activity Assistance: None  Difficulty to lie flat for more than 30 minutes: None  Pain interfering with walking: None  Muscle pain or stiffness: None  Recent fall: Yes - Recent (Less than 3 months)  Assistive device: None  Neurological  Neurological (WDL): Exceptions to WDL  History of LOC?: None  Headaches: None  Difficulty walking: None  Difficulty with speech: None  Difficulty with memory: Yes - Recent (Less than 3 months)  Vertigo (Dizziness): None  Dominant Hand: Right  Seizures: None  Difficulty with Balance: Yes - Recent (Less than 3 months)  Numbness and/or tingling: None  Psychological/Emotional  Psychological/Emotional (WDL): All psychological/emotional elements are within defined limits  Hematological/Lymphatic  Hematological/Lymphatic (WDL): All  "hematological/lymphatic elements are within defined limits  Dermatological  Dermatologic (WDL): Exceptions to WDL  Open wounds: None  Rash : None  Hair Loss: Yes - Recent (Less than 3 months)  Healing Incision: None  Changes in moles: Yes - Recent (Less than 3 months)  Genitourinary/Reproductive  Genitourinary/Reproductive (WDL): Exceptions to WDL  Urinary Frequency: None  Urinary Incontinence: None  Painful urination: None  Urination more than 2 times a night: Yes - Recent (Less than 3 months)  Urinary Urgency: None  Difficulty Initiating Urine Stream: None  Blood in urine: None  Sensation of incomplete emptying of bladder: None  Reproductive (Females only)     Pain  Currently in Pain: No/denies    Physical Exam    Recent Vitals 2/7/2019   Height 5' 4\"   Weight 140 lbs   BSA (m2) 1.69 m2   /59   Pulse 66   Temp 98.4   Temp src 1   SpO2 90   Some recent data might be hidden     General: alert, awake, not in acute distress  HEENT: Head: Normal, normocephalic, atraumatic.  Eye: Normal external eye, conjunctiva, lids cornea, JESUS.  Ears:  Non-tender.  Nose: Normal external nose, mucus membranes and septum.  Pharynx: Dental Hygiene adequate. Normal buccal mucosa. Normal pharynx.  Neck / Thyroid: Supple, no masses, nodes, nodules or enlargement.  Lymphatics: No abnormally enlarged lymph nodes.  Chest: Normal chest wall and respirations. Diminished breath sounds in right lung about mid lung. No wheezing nor crackles.  Breasts: ulcerated mass in right breast in lower quadrant with tumor retraction. Nipple was not seen. Overlying skin is normal. Left breast is pendulous and no palpable masses.   Heart: S1 S2 RRR, no murmur.   Abdomen: abdomen is soft without significant tenderness, masses, organomegaly or guarding  Extremities: normal strength, tone, and muscle mass  Skin: normal. no rash or abnormalities  CNS: non focal.    Lab Results    Recent Results (from the past 168 hour(s))   CA 27.29, Breast Tumor " Marker()   Result Value Ref Range    CA 27.29, Serum 62 (H) 0 - 39 U/mL       Imaging Results    Ct Chest Abdomen Pelvis With Oral With Iv Cont    Result Date: 1/31/2019  [facility] RADIOLOGY EXAM: CT CHEST ABDOMEN PELVIS W ORAL W IV CONTRAST LOCATION: [facility] DATE/TIME: 1/31/2019 10:43 AM INDICATION: Newly diagnosed breast cancer.  right pleural effusion. COMPARISON: None. TECHNIQUE: Helical thin-section CT scan of the chest, abdomen, and pelvis was performed before and after injection of IV contrast. Multiplanar reformats were obtained. Dose reduction techniques were used. CONTRAST: Iohexol (Omni) 75mL FINDINGS: CHEST: Moderate right pleural effusion with atelectasis right lower lobe and right middle lobe. There is nodular enhancing pleural thickening surrounding the fluid most consistent with malignant implants. Spiculated appearance with posterior right breast  appears to abut and possibly invade into the anterior chest wall. Within left lung there are numerous very tiny nodules measuring 2 mm which are indeterminant. Borderline right precarinal lymph node measures 1.4 x 0.8 cm. Within the right axilla there are normal size lymph nodes but several have a slightly spiculated appearance concerning for potential solange involvement.  ABDOMEN: Liver, gallbladder, bile ducts, pancreas, spleen, adrenals and adrenals negative. PELVIS: Hysterectomy, no adnexal mass or free fluid. No adenopathy. MUSCULOSKELETAL: Degenerative changes of lumbar spine. No suspicious lesion evident.     CONCLUSION: 1.  Malignant right pleural effusion with nodular implants studding the pleural surface. 2.  Spiculated posterior lesion within right breast abuts and may invade the chest wall. 3.  Numerous very tiny indeterminate pulmonary nodules within left lung should be monitored on follow-up exams.    Xr Chest 2 Views    Result Date: 1/29/2019  XR CHEST 2 VIEWS 1/29/2019 10:54 AM INDICATION: Pleural effusion, not elsewhere  classified COMPARISON: 01/22/2019. FINDINGS: There has been no change in the right pleural effusion with associated atelectasis or consolidation at the right lung base. No pneumothorax. The left lung is clear. The heart size and pulmonary vascularity are normal.    Xr Chest 2 Views    Result Date: 1/22/2019  XR CHEST 2 VIEWS 1/22/2019 INDICATION: Malignant neoplasm of lower-inner quadrant of right female breast COMPARISON: 9/20/2016 FINDINGS: New moderate sized right pleural effusion with right basilar atelectasis and/or airspace disease. Left lung is clear. Right cardiac borders are obscured. The pulmonary vasculature is within normal limits for    Mammo Diagnostic Bilateral    Result Date: 1/25/2019  MAMMO DIAGNOSTIC 2D BILATERAL, US BREAST LIMITED (FOCAL) RIGHT 1/25/2019 10:47 AM INDICATION: Right inferior breast mass eroding through skin. Dermatologist biopsy 4 days ago. COMPARISON: 09/09/2008 MAMMOGRAPHIC FINDINGS: Bilateral full-field digital diagnostic mammograms performed. There are scattered areas of fibroglandular density. A BB was placed over the inferior right breast fungating mass. There is an incompletely imaged right inferior stellate breast mass adjacent the chest wall. This is highly suspicious for cancer. No radiographic evidence left breast malignancy. Images evaluated with the assistance of CAD. ULTRASOUND FINDINGS: Targeted ultrasound of the right breast inferolateral breast and axilla was performed. Imaging of the inferior mass was difficult because there was no overlying intact skin. Imaging was performed from above the mass and angled downward into the mass which is at the 6:00 position. Its size is estimated at 2.6 x 1.9 x 1.9 cm There are 2 additional irregular hypoechoic masses at the 10:00 zone 2 position measuring 10 x 9 x 6 mm, and 6 x 4 x 4 mm. These are suspicious for cancer. The axilla contains at least 2 small but suspicious appearing lymph nodes measuring 9 and 10 mm.     Fungating  right 6:00 position breast mass highly suspicious for cancer. If the prior dermatology biopsy is inconclusive, ultrasound-guided core biopsy could be obtained. Additional smaller right 10:00 position breast masses suspicious for malignancy. Suspicious right axillary lymph nodes. No evidence left breast malignancy. ACR BI-RADS Category 5: Highly Suggestive of Malignancy. Findings, and the potential need for ultrasound-guided core biopsy were discussed with the patient. These results and patient management were discussed with Dr. Farmer.    Us Thoracentesis    Result Date: 1/31/2019  [facility] RADIOLOGY EXAM: 1. RIGHT THORACENTESIS 2. ULTRASOUND GUIDANCE LOCATION: [facility] DATE/TIME: 1/31/2019 11:29 AM INDICATION: Pleural effusion. PROCEDURE: Informed consent obtained. Time out performed. The chest was prepped and draped in sterile fashion. 5 mL of 1 % lidocaine was infused into the local soft tissues. Under direct ultrasound guidance, a 5 Serbian Ewireless catheter system was placed into the pleural effusion. 800 cc of turbid deep suraj-colored fluid were removed and sent to lab, if requested. Patient tolerated procedure well though did develop mild pleuritic chest pain lasted approximately 5 minutes. She was monitored in the department for 20 minutes and left the department feeling fine, in her preprocedural state. RADIOLOGIC SUPERVISION AND INTERPRETATION: ULTRASOUND GUIDANCE: Images demonstrate the pleural effusion. Catheter seen in good position.     CONCLUSION: Status post right ultrasound-guided thoracentesis. Reference CPT Code: 38470    Us Breast Limited (focal) Right    Result Date: 1/25/2019  MAMMO DIAGNOSTIC 2D BILATERAL, US BREAST LIMITED (FOCAL) RIGHT 1/25/2019 10:47 AM INDICATION: Right inferior breast mass eroding through skin. Dermatologist biopsy 4 days ago. COMPARISON: 09/09/2008 MAMMOGRAPHIC FINDINGS: Bilateral full-field digital diagnostic mammograms performed. There are scattered areas of  fibroglandular density. A BB was placed over the inferior right breast fungating mass. There is an incompletely imaged right inferior stellate breast mass adjacent the chest wall. This is highly suspicious for cancer. No radiographic evidence left breast malignancy. Images evaluated with the assistance of CAD. ULTRASOUND FINDINGS: Targeted ultrasound of the right breast inferolateral breast and axilla was performed. Imaging of the inferior mass was difficult because there was no overlying intact skin. Imaging was performed from above the mass and angled downward into the mass which is at the 6:00 position. Its size is estimated at 2.6 x 1.9 x 1.9 cm There are 2 additional irregular hypoechoic masses at the 10:00 zone 2 position measuring 10 x 9 x 6 mm, and 6 x 4 x 4 mm. These are suspicious for cancer. The axilla contains at least 2 small but suspicious appearing lymph nodes measuring 9 and 10 mm.     Fungating right 6:00 position breast mass highly suspicious for cancer. If the prior dermatology biopsy is inconclusive, ultrasound-guided core biopsy could be obtained. Additional smaller right 10:00 position breast masses suspicious for malignancy. Suspicious right axillary lymph nodes. No evidence left breast malignancy. ACR BI-RADS Category 5: Highly Suggestive of Malignancy. Findings, and the potential need for ultrasound-guided core biopsy were discussed with the patient. These results and patient management were discussed with Dr. Farmer.    TT: 80 minutes and more than 50% was spent on coordination and counseling of care.    Signed by: Evaristo Zhang MD

## 2021-06-23 NOTE — TELEPHONE ENCOUNTER
Patient Returning Call  Reason for call:  Caller stated the pathology report is delayed due to being sent out.   Information relayed to patient:  Unable to send due to delay. Patient is scheduled today with Don Goodson MD  Patient has additional questions:  Yes  If YES, what are your questions/concerns:  Please return call to Nena to discuss  Okay to leave a detailed message?: Yes

## 2021-06-23 NOTE — TELEPHONE ENCOUNTER
Per Dr. Wilson's request, I called Humera with Dr. Wilson's recommendation to help her schedule a medical oncology consult instead of a breast surgeon appt.  Humera said her son took today off so she was hoping to be seen today.  I scheduled her to see Dr. Zhang at [facility] location at 1100 today, check in at 1015 for paperwork.  Humera states understanding of the change in appts for today.

## 2021-06-23 NOTE — TELEPHONE ENCOUNTER
Orders being requested: Need to have what test on the fluid the provider wants done put in Epic.    Reason service is needed/diagnosis: Ultra Sound Thoracentesis  When are orders needed by: ASAP as patients appointment is today at 11AM.  Where to send Orders: [facility] and call Brittnee at [phone]  Okay to leave detailed message?  Yes

## 2021-06-23 NOTE — PROGRESS NOTES
I met with Humera and her son today following her visit with Dr. Zhang.  I gave her my contact information and info on my role.  Reviewed her follow up testing and appts.  I gave her a handout from [facility] on Newly Diagnosed Metastatic Breast Cancer.  I also gave her information on our Affirming Life Stage 4 Support Group.  She said she has her julita and a good support network.  Support and encouragement provided, invited calls.

## 2021-06-23 NOTE — TELEPHONE ENCOUNTER
Who is calling:  Patient  Reason for Call:  Dermatology questions and Samantha was not able to reach Don Goodson MD last Friday  Date of last appointment with primary care: 1/22/19  Has the patient been recently seen:  Yes  Okay to leave a detailed message: Yes

## 2021-06-24 NOTE — PROGRESS NOTES
[facility] Hematology and Oncology Progress Note    Patient: Humera Espinal  MRN: [id number]  Date of Service: 03/07/2019        Reason for Visit    Chief Complaint   Patient presents with     HE Cancer     Breast Cancer       Assessment and Plan  Cancer Staging  Malignant neoplasm of lower-inner quadrant of female breast (H)  Staging form: Breast, AJCC 8th Edition  - Clinical stage from 2/7/2019: Stage IV (cT4b, cN1, pM1, GX, ER: Positive, TX: Positive, HER2: Negative) - Signed by Evaristo Zhang MD on 2/9/2019      ECOG Performance   ECOG Performance Status: 0     Distress Assessment  Distress Assessment Score: No distress    Pain  Currently in Pain: No/denies    #.  Metastatic ER +/TX +/HER-2-right breast cancer (lower outer quadrant)-metastases to pleura and possible pulmonary nodules and cytology confirmed malignant pleural effusion.  Initiated palliative intent letrozole on 2/7/2019.     Reviewed the bone scan and inform her that she does not have metastatic disease in the bone.  She is currently taking letrozole with no recalled side effects.  She decided against palbociclib due to concerns about potential side effects.   Labs were reviewed and they are fairly good and adequate.  She will continue letrozole.  At the same time, she will continue calcium and vitamin D.   She has several questions regarding diet advice and we discussed about how controversial on diet recommendation on the way you get the information.  Simply, I recommended her to focus on a lot of vegetables, fruits and limited use of animal products etc.  She is using some supplements with pectin and turkey/Gianluca capsule in addition to some therapy with hot water bath followed with electric blanket for about 20 minutes etc.   Follow-up with me in about 4 weeks with repeat labs.   We will plan to obtain CT scan for restaging in May 2019.     #.  Malignant right-sided pleural effusion   Last thoracentesis was on 2/14/2019, removed about  0.65 L of pleural fluid.   She is clinically well at this point.  She clearly has some reaccumulation or residual right-sided pleural effusion on clinical exam.  She is advised to call me if she has any symptom that would indicate for a thoracentesis again.  She agreed to do that.   Will continue right sided thoracentesis as needed.    #.  She wanted me to complete application for hospice residential facility as she has tried to prepare to go to hospice residential facility at the point that she is not able to care for herself.  We will complete that.    Problem List    1. Malignant neoplasm of lower-inner quadrant of right female breast, unspecified estrogen receptor status (H)        ______________________________________________________________________________    History of Present Illness    Mrs. Espinal presents today herself.  She is overall doing really well.  She has good energy level doing deep cleaning of her house.  She came to the clinic today by herself.  She noted tightness in her chest yesterday and felt reaccumulation of the fluid in her lungs, however today she is feeling well that she does not feel she need it.  No recall side effects from letrozole.  She got a lot of advice from her friends from [employer] regarding diet and cancer treatment etc that making her a little confused.      Pain Status  Currently in Pain: No/denies    Review of Systems    Oncology Nurse Assessment/CMA Intake: Constitutional  Constitutional (WDL): All constitutional elements are within defined limits  Neurosensory  Neurosensory (WDL): Exceptions to WDL  Peripheral Motor Neuropathy: None  Ataxia: None  Peripheral Sensory Neuropathy: None  Confusion: None  Syncope: None  Eye   Eye Disorder (WDL): All eye disorder elements are within defined limits  Ear  Ear Disorder (WDL): Exceptions to WDL  Ear Pain: None  Tinnitus: Mild symptoms, intervention not indicated(intermittent left, at noc)  Cardiovascular  Cardiovascular (WDL):  Exceptions to WDL  Palpitations: None  Edema: No  Phlebitis: None  Superficial thrombophlebitis: None  Pulmonary  Respiratory (WDL): Exceptions to WDL  Cough: Mild symptoms, nonprescription intervention indicated(rare, dry)  Dyspnea: Shortness of breath with moderate exertion  Hypoxia: None  Gastrointestinal  Gastrointestinal (WDL): Exceptions to WDL  Anorexia: None  Constipation: None  Diarrhea: None  Dysphagia: None  Esophagitis: None  Nausea: None  Pharyngitis: None  Vomiting: None  Dysgeusia: None  Dry Mouth: None  Genitourinary  Genitourinary (WDL): All genitourinary elements are within defined limits  Lymphatic  Lymph (WDL): All lymph disorder elements are within defined limits  Musculoskeletal and Connective Tissue  Musculoskeletal and Connetive Tissue Disorders (WDL): All Musculoskeletal and Connetive Tissue Disorder elements are within defined limits  Integumentary  Integumentary (WDL): All integumentary elements are within defined limits  Patient Coping  Patient Coping: Accepting;Open/discussion  Distress Assessment  Distress Assessment Score: No distress  Accompanied by  Accompanied by: Alone  Oral Chemo Adherence         Past History  Past Medical History:   Diagnosis Date     Hyperlipidemia      Osteoarthritis      Osteopenia      Osteoporosis      Skin cancer 2017       Physical Exam    Recent Vitals 3/7/2019   Height -   Weight 137 lbs 2 oz   BSA (m2) 1.68 m2   /66   Pulse 86   Temp 97.7   Temp src 1   SpO2 97   Some recent data might be hidden     General: alert, awake, not in acute distress  HEENT: Head: Normal, normocephalic, atraumatic.  Eye: Normal external eye, conjunctiva, lids cornea, JESUS.  Ears:  Non-tender.  Nose: Normal external nose, mucus membranes and septum.  Pharynx: Dental Hygiene adequate. Normal buccal mucosa. Normal pharynx.  Neck / Thyroid: Supple, no masses, nodes, nodules or enlargement.  Lymphatics: No abnormally enlarged lymph nodes.  Chest: Normal chest wall and  respirations.  Diminished breath sounds in the right lower lobe.  No wheezing or crackles.  Heart: S1 S2 RRR, no murmur.   Abdomen: abdomen is soft without significant tenderness, masses, organomegaly or guarding  Extremities: normal strength, tone, and muscle mass  Skin: normal. no rash or abnormalities  CNS: non focal.    Lab Results    Recent Results (from the past 168 hour(s))   HM1 (CBC with Diff)   Result Value Ref Range    WBC 8.6 4.0 - 11.0 thou/uL    RBC 4.63 3.80 - 5.40 mill/uL    Hemoglobin 14.1 12.0 - 16.0 g/dL    Hematocrit 43.5 35.0 - 47.0 %    MCV 94 80 - 100 fL    MCH 30.5 27.0 - 34.0 pg    MCHC 32.4 32.0 - 36.0 g/dL    RDW 13.0 11.0 - 14.5 %    Platelets 275 140 - 440 thou/uL    MPV 9.6 8.5 - 12.5 fL    Neutrophils % 77 (H) 50 - 70 %    Lymphocytes % 15 (L) 20 - 40 %    Monocytes % 5 2 - 10 %    Eosinophils % 3 0 - 6 %    Basophils % 1 0 - 2 %    Neutrophils Absolute 6.6 2.0 - 7.7 thou/uL    Lymphocytes Absolute 1.3 0.8 - 4.4 thou/uL    Monocytes Absolute 0.5 0.0 - 0.9 thou/uL    Eosinophils Absolute 0.2 0.0 - 0.4 thou/uL    Basophils Absolute 0.1 0.0 - 0.2 thou/uL       Imaging    Nm Bone Scan Whole Body    Result Date: 2/14/2019  Merged with [facility] RADIOLOGY EXAM: NM BONE SCAN WHOLE BODY LOCATION: [facility] DATE/TIME: 2/14/2019 3:11 PM INDICATION: Metastatic breast cancer, staging COMPARISON: CT chest, abdomen, and pelvis 1/31/2019 TECHNIQUE: 25 mCi technetium-99m MDP were injected intravenously. Anterior and posterior delayed whole body images were obtained with additional lateral images of the skull. FINDINGS: There is uptake in the lumbar spine corresponding to degenerative findings on the CT. There is mild degenerative uptake in the feet, shoulders, wrists and right knee. Otherwise normal radiotracer distribution. No evidence of skeletal metastases.     CONCLUSION: 1.  No evidence of skeletal metastases.    Us Thoracentesis    Result Date: 2/14/2019  Merged with [facility] RADIOLOGY EXAM: 1. RIGHT  THORACENTESIS 2. ULTRASOUND GUIDANCE LOCATION: [facility] DATE/TIME: 2/14/2019 1:55 PM INDICATION: Pleural effusion. PROCEDURE: Informed consent obtained. Time out performed. The chest was prepped and draped in sterile fashion. 10 mL of 1 % lidocaine was infused into the local soft tissues. Under direct ultrasound guidance, a 5 [place] Yueh catheter system was placed into the pleural effusion. 0.65 liters of suraj fluid were removed and sent to lab, if requested. Patient tolerated procedure well. RADIOLOGIC SUPERVISION AND INTERPRETATION: ULTRASOUND GUIDANCE: Images demonstrate the pleural effusion. Catheter seen in good position.     CONCLUSION: Status post right ultrasound-guided thoracentesis. Reference CPT Code: 28473    TT: 40 minutes and more than 50% was spent on coordination and counseling of care.    Signed by: Evaristo Zhang MD

## 2021-06-24 NOTE — TELEPHONE ENCOUNTER
"Patient calling today with complaint of fluid in her right lung.  Last thoracentesis was 2/14/19.  Pt states lung feels \"heavy\", she is fatigues and experiences shortness of breath with exertion.  Pt denies fever or chills.  Last OV note said for pt to call if these sx re-occur and Right Thoracentesis would be ordered. Symptoms relayed to Dr Zhang who ordered Right Thoracentesis.  Pt notified, verbalized understanding and transferred to scheduling.    "

## 2021-06-24 NOTE — PROGRESS NOTES
Patient here today for labs and 4 week follow-up with Dr. Zhang for metastatic breast CA/HAYLEY Sanz RN

## 2021-06-24 NOTE — TELEPHONE ENCOUNTER
Name of form/paperwork: [employer] Handicap parking sticker   Have you been seen for this request: No  Do we have the form: No  When is form needed by: asap  How would you like the form returned: patient is requesting oDn Goodson MD to fill out the physician portion and then mail the patient portion out to her home.   Fax Number: n/a   Patient Notified form requests are processed in 3-5 business days: Yes  (If patient needs form sooner, please note that in this message.)  Okay to leave a detailed message? Yes    Patient states this was already done by Don Goodson MD last month but [facility] still does not have the copy. Requesting to do a new form. Writer unable to find notes about this.

## 2021-06-25 NOTE — TELEPHONE ENCOUNTER
Spoke with the patient and verified medications that she is taking.  Also let the patient know that we are unable to upload medication list to my chart.  She stated that she would like her medication list sent in the mail then.  Verified patient address, printed medication list, and mailed to the patient.  She had no further questions at this time.  Melvina PUENTE CMA/PANCHO....................4:15 PM

## 2021-06-25 NOTE — TELEPHONE ENCOUNTER
Name of form/paperwork: Other:  patient is applying for a spot at a cancer home.  Part of the application process is patient needs to send in an updated medication list.  Please update list if needed and down load the list to letters in patient MyChart.  She will print it from home.      Have you been seen for this request: N/A  Do we have the form: in chart  When is form needed by: when possible  How would you like the form returned: Please upload in [employer] when completed.   Fax Number: NA  Patient Notified form requests are processed in 3-5 business days: No  (If patient needs form sooner, please note that in this message.)  Okay to leave a detailed message? No [phone]

## 2021-07-02 NOTE — H&P
H&P by Naty Moss CNP at 9/27/2019  8:00 AM     Author: Isa, Naty J, CNP Service: Interventional Radiology Author Type: Nurse Practitioner    Filed: 9/27/2019  8:23 AM Date of Service: 9/27/2019  8:00 AM Status: Signed    : Naty Moss CNP (Nurse Practitioner)         Interventional Radiology - Pre-Procedure Note:  9/27/2019    Procedure Requested: Right tunneled pleural drainage catheter placement  Requested by: Dr. León    History and Physical Reviewed: H&P documented within 30 days (by Dr. León on 9/12/19).  I have personally reviewed the patient's medical history and have updated the medical record as necessary.    Brief HPI: Humera Espinal is a 85 y.o. old female with history of metastatic breast cancer with recurrent, malignant right pleural effusion requiring frequent thoracenteses (every 2 week or more basis since April 2019).  She is currently receiving palliative chemotherapy and disease is noted to be progressive. Patient was seen by Dr. León 9/12 for concern of shortness of breath. CT showed right pleural effusion, very large, associated with trapped lung and hydropneumothorax. Tunneled right pleural drainage catheter placement recommended for management of effusion at home. As of right now, patient is not enrolled in hospice and does not have homecare. She will be managing this drain at home with the assistance of family as needed. She did seem somewhat overwhelmed with drain placement and management today. Her family was not present during my pre-procedure visit. She most recently had right thoracentesis on 9/25 with 0.7 liters removed, she noted no improvement of symptoms (shortness of breath, fatigue) following drainage.      IMAGING:  EXAM:   1. RIGHT THORACENTESIS  2. ULTRASOUND GUIDANCE  LOCATION: [facility]  DATE/TIME: 9/25/2019 2:47 PM     INDICATION: Pleural effusion.     PROCEDURE: Informed consent obtained. Time out performed. The chest was prepped and draped in  "sterile fashion. 8 mL of 1 % lidocaine was infused into the local soft tissues. Under direct ultrasound guidance, a 5 [place] of [place] Yueh catheter system was placed   into the pleural effusion.      0.7 liters of clear yellow fluid were removed and sent to lab, if requested.     Patient tolerated procedure.     RADIOLOGIC SUPERVISION AND INTERPRETATION:  ULTRASOUND GUIDANCE: Images demonstrate the pleural effusion. Catheter seen in good position.     IMPRESSION:   CONCLUSION:  Status post right ultrasound-guided thoracentesis.    EXAM: CT CHEST ABDOMEN PELVIS W ORAL W IV CONTRAST  LOCATION: [facility]  DATE/TIME: 8/27/2019 3:19 PM     INDICATION: Breast cancer, mets suspected, staging    IMPRESSION:   CONCLUSION:  1.  Extensive right-sided pleural metastases with probable trapped lung and mixture of air and fluid in the right pleural space. The pleural thickening has mildly progressed from the prior study.  2.  Multiple small left pulmonary nodules suspicious for pulmonary metastasis are stable however there is a new small left pleural effusion.  3.  1 cm enhancing lesion right lobe of the liver is unchanged no other focal hepatic lesions no lymphadenopathy in the abdomen or pelvis  4.  Mild left pyelocaliectasis is unchanged    NPO: MN  ANTICOAGULANTS: None  ANTIBIOTICS: Ancef for procedure     ALLERGIES  Patient has no known allergies.    LABS:  INR (no units)   Date Value   08/27/2019 1.02     Hemoglobin (g/dL)   Date Value   09/25/2019 12.7     Platelets (thou/uL)   Date Value   09/25/2019 190     EXAM:  /79 (Patient Position: Sitting)   Pulse 97   Temp 98  F (36.7  C) (Oral)   Resp 20   Ht 5' 4\" (1.626 m)   Wt 122 lb 3 oz (55.4 kg)   LMP 01/01/1962   SpO2 92%   BMI 20.97 kg/m    General: Stable. In no acute distress.  Neuro: A&O x 3.   Resp: Lungs CTA left, diminished over right   Cardio: S1S2 and reg, without murmur, clicks or rubs  Skin: Without excoriations, ecchymosis, erythema, lesions or " open sores on right chest/abdomen.    Pre-Sedation Assessment:  Mallampati Airway Classification: Class 3: soft and hard palates clearly visible  Previous reaction to anesthesia/sedation: no  Sedation plan based on assessment: Moderate  ASA Classification: ASA 3 - Patient with moderate systemic disease with functional limitations      ASSESSMENT/PLAN:   85 year old female with metastatic breast cancer with recurrent malignant right pleural effusion requiring frequent thoracenteses     Tunneled right pleural drainage catheter placement with sedation     Patient requires post procedure educational aspira catheter drainage.  Please drain 1L for education teaching and the remaining fluid should be drained by wall suction.     Patient and family were instructed on the care and management of the Aspira drain. All questions answered and patient/family expressed their understanding of Aspira care/management. Staff RN to continue education and ensure patient is comfortable with drain management prior to discharge. [facility] will fax Aspira gravity bag/dressing kit prescription order to [address] [place] [address] for patient to receive product at home.    Procedure, risk/benefits, and sedation reviewed with pt/family. All questions answered. Consent obtained. OK to proceed with above radiology procedure.     DNR wishes discussed with patient, DNR wishes to be placed on hold for today's procedure    Naty Moss, CNP  Interventional Radiology

## 2021-07-03 NOTE — ADDENDUM NOTE
Addendum Note by Sarita King CMA at 4/4/2019 10:15 AM     Author: Sarita King CMA Service: -- Author Type: Certified Medical Assistant    Filed: 4/4/2019 11:08 AM Encounter Date: 4/4/2019 Status: Signed    : Sarita King CMA (Certified Medical Assistant)    Addended by: SARITA KING on: 4/4/2019 11:08 AM        Modules accepted: Orders

## 2021-07-21 ENCOUNTER — RECORDS - HEALTHEAST (OUTPATIENT)
Dept: ADMINISTRATIVE | Facility: CLINIC | Age: 86
End: 2021-07-21

## 2021-08-21 ENCOUNTER — HEALTH MAINTENANCE LETTER (OUTPATIENT)
Age: 86
End: 2021-08-21

## 2021-10-16 ENCOUNTER — HEALTH MAINTENANCE LETTER (OUTPATIENT)
Age: 86
End: 2021-10-16

## 2022-10-01 ENCOUNTER — HEALTH MAINTENANCE LETTER (OUTPATIENT)
Age: 87
End: 2022-10-01

## 2023-10-15 ENCOUNTER — HEALTH MAINTENANCE LETTER (OUTPATIENT)
Age: 88
End: 2023-10-15

## 2024-03-03 ENCOUNTER — HEALTH MAINTENANCE LETTER (OUTPATIENT)
Age: 89
End: 2024-03-03